# Patient Record
Sex: MALE | Race: ASIAN | NOT HISPANIC OR LATINO | Employment: FULL TIME | URBAN - METROPOLITAN AREA
[De-identification: names, ages, dates, MRNs, and addresses within clinical notes are randomized per-mention and may not be internally consistent; named-entity substitution may affect disease eponyms.]

---

## 2020-08-06 ENCOUNTER — RECORDS - HEALTHEAST (OUTPATIENT)
Dept: LAB | Facility: HOSPITAL | Age: 35
End: 2020-08-06

## 2020-08-06 LAB
BASOPHILS # BLD AUTO: 0 THOU/UL (ref 0–0.2)
BASOPHILS NFR BLD AUTO: 0 % (ref 0–2)
EOSINOPHIL # BLD AUTO: 0.3 THOU/UL (ref 0–0.4)
EOSINOPHIL NFR BLD AUTO: 5 % (ref 0–6)
ERYTHROCYTE [DISTWIDTH] IN BLOOD BY AUTOMATED COUNT: 12.6 % (ref 11–14.5)
HCT VFR BLD AUTO: 49.1 % (ref 40–54)
HGB BLD-MCNC: 16.2 G/DL (ref 14–18)
LYMPHOCYTES # BLD AUTO: 2.1 THOU/UL (ref 0.8–4.4)
LYMPHOCYTES NFR BLD AUTO: 30 % (ref 20–40)
MCH RBC QN AUTO: 28.7 PG (ref 27–34)
MCHC RBC AUTO-ENTMCNC: 33 G/DL (ref 32–36)
MCV RBC AUTO: 87 FL (ref 80–100)
MONOCYTES # BLD AUTO: 0.4 THOU/UL (ref 0–0.9)
MONOCYTES NFR BLD AUTO: 6 % (ref 2–10)
NEUTROPHILS # BLD AUTO: 4 THOU/UL (ref 2–7.7)
NEUTROPHILS NFR BLD AUTO: 58 % (ref 50–70)
PLATELET # BLD AUTO: 190 THOU/UL (ref 140–440)
PMV BLD AUTO: 10 FL (ref 8.5–12.5)
RBC # BLD AUTO: 5.65 MILL/UL (ref 4.4–6.2)
RHEUMATOID FACT SERPL-ACNC: <15 IU/ML (ref 0–30)
WBC: 6.9 THOU/UL (ref 4–11)

## 2020-08-07 LAB — ANA SER QL: 0.6 U

## 2020-08-08 LAB — ACE SERPL-CCNC: 28 U/L (ref 9–67)

## 2020-08-12 LAB
B LOCUS: NORMAL
B27TEST METHOD: NORMAL

## 2021-08-09 ENCOUNTER — HOSPITAL ENCOUNTER (EMERGENCY)
Facility: HOSPITAL | Age: 36
Discharge: HOME | End: 2021-08-09
Attending: FAMILY MEDICINE | Admitting: FAMILY MEDICINE
Payer: COMMERCIAL

## 2021-08-09 VITALS
OXYGEN SATURATION: 95 % | WEIGHT: 300 LBS | HEART RATE: 60 BPM | BODY MASS INDEX: 47.09 KG/M2 | RESPIRATION RATE: 20 BRPM | SYSTOLIC BLOOD PRESSURE: 165 MMHG | TEMPERATURE: 97.6 F | DIASTOLIC BLOOD PRESSURE: 100 MMHG | HEIGHT: 67 IN

## 2021-08-09 DIAGNOSIS — M51.16 HERNIATION OF LUMBAR INTERVERTEBRAL DISC WITH RADICULOPATHY: ICD-10-CM

## 2021-08-09 PROCEDURE — 99284 EMERGENCY DEPT VISIT MOD MDM: CPT

## 2021-08-09 PROCEDURE — 96372 THER/PROPH/DIAG INJ SC/IM: CPT | Performed by: FAMILY MEDICINE

## 2021-08-09 PROCEDURE — 250N000011 HC RX IP 250 OP 636: Performed by: FAMILY MEDICINE

## 2021-08-09 RX ORDER — GABAPENTIN 300 MG/1
300 CAPSULE ORAL 3 TIMES DAILY
Qty: 90 CAPSULE | Refills: 0 | Status: SHIPPED | OUTPATIENT
Start: 2021-08-09

## 2021-08-09 RX ORDER — KETOROLAC TROMETHAMINE 30 MG/ML
30 INJECTION, SOLUTION INTRAMUSCULAR; INTRAVENOUS ONCE
Status: COMPLETED | OUTPATIENT
Start: 2021-08-09 | End: 2021-08-09

## 2021-08-09 RX ORDER — PREDNISONE 10 MG/1
TABLET ORAL
Qty: 32 TABLET | Refills: 0 | Status: SHIPPED | OUTPATIENT
Start: 2021-08-09 | End: 2021-08-19

## 2021-08-09 RX ORDER — HYDROCODONE BITARTRATE AND ACETAMINOPHEN 5; 325 MG/1; MG/1
1 TABLET ORAL EVERY 6 HOURS PRN
Qty: 10 TABLET | Refills: 0 | Status: SHIPPED | OUTPATIENT
Start: 2021-08-09 | End: 2021-08-12

## 2021-08-09 RX ADMIN — KETOROLAC TROMETHAMINE 30 MG: 30 INJECTION, SOLUTION INTRAMUSCULAR; INTRAVENOUS at 12:13

## 2021-08-09 ASSESSMENT — ENCOUNTER SYMPTOMS
BACK PAIN: 1
NUMBNESS: 1

## 2021-08-09 ASSESSMENT — MIFFLIN-ST. JEOR: SCORE: 2244.42

## 2021-08-09 NOTE — Clinical Note
Marylou Simms was seen and treated in our emergency department on 8/9/2021.  He may return to work on 08/12/2021.       If you have any questions or concerns, please don't hesitate to call.      Ryne Tran MD

## 2021-08-09 NOTE — ED PROVIDER NOTES
EMERGENCY DEPARTMENT ENCOUNTER      NAME: Marylou Simms  AGE: 36 year old male  YOB: 1985  MRN: [id number]  EVALUATION DATE & TIME: 8/9/2021 11:54 AM    PCP: Freddy Vila    ED PROVIDER: Ryne Tran M.D.    Chief Complaint   Patient presents with     Leg Pain       FINAL IMPRESSION:  1. Herniation of lumbar intervertebral disc with radiculopathy        ED COURSE & MEDICAL DECISION MAKING:    Pertinent Labs & Imaging studies personally reviewed and interpreted by me. (See chart for details)    12:01 PM Patient seen and examined, prior records reviewed.  Differential diagnosis includes but not limited to lumbar strain, lumbar sprain, vertebral fracture, compression fracture, cauda equina syndrome, epidural abscess, spondylolisthesis, discitis, osteomyelitis, aortic dissection, aortic aneurysm.  Patient with right leg pain extending from buttock distally with some tingling, slightly decreased sensation of lower leg, upper leg intact, strength intact.  No bowel or bladder incontinence.  Symptoms are consistent with radiculopathy and likely herniated disc.  Patient will start on gabapentin, Norco, prednisone.  He is just finishing a Medrol Dosepak but will need extension is steroid to acute injury.  Follow-up with spine center.       At the conclusion of the encounter I discussed the results of all of the tests and the disposition. The questions were answered. The patient or family acknowledged understanding and was agreeable with the care plan.     PROCEDURES:   Procedures    MEDICATIONS GIVEN IN THE EMERGENCY:  Medications   ketorolac (TORADOL) injection 30 mg (30 mg Intramuscular Given 8/9/21 1213)       NEW PRESCRIPTIONS STARTED AT TODAY'S ER VISIT  Discharge Medication List as of 8/9/2021 12:37 PM      START taking these medications    Details   gabapentin (NEURONTIN) 300 MG capsule Take 1 capsule (300 mg) by mouth 3 times daily, Disp-90 capsule, R-0, Local Print       HYDROcodone-acetaminophen (NORCO) 5-325 MG tablet Take 1 tablet by mouth every 6 hours as needed for severe pain, Disp-10 tablet, R-0, Local Print      predniSONE (DELTASONE) 10 MG tablet Take 4 tablets daily for 5 days,  take 2 tablets daily for 3 days, take 1 tablet daily for 3 days, take half a tablet for 3 days., Disp-32 tablet, R-0, Local Print             =================================================================    HPI    Patient information was obtained from: Patient      Marylou Simms is a 36 year old male with a pertinent history of chronic right ankle pain who presents to this ED via walk in for evaluation of right back and leg pain.    Patient endorses lower right back pain that radiates down his right leg since yesterday (08/08). He also reports tingling/numbness in his toes. Patient states he took Tylenol yesterday for his symptoms. Of note, patient has been on medrol for gout the past few days. His last day on the medication will be tomorrow (08/10). Patient denies any other complaints at this time.    REVIEW OF SYSTEMS   Review of Systems   Musculoskeletal: Positive for back pain (Lower right).        Positive for right leg pain   Neurological: Positive for numbness (Toes).      All other systems reviewed and negative    PAST MEDICAL HISTORY:  No past medical history on file.    PAST SURGICAL HISTORY:  No past surgical history on file.    CURRENT MEDICATIONS:    No current facility-administered medications for this encounter.     Current Outpatient Medications   Medication     gabapentin (NEURONTIN) 300 MG capsule     HYDROcodone-acetaminophen (NORCO) 5-325 MG tablet     predniSONE (DELTASONE) 10 MG tablet     cetirizine (ZYRTEC) 10 MG tablet     oxyCODONE (ROXICODONE) 5 MG immediate release tablet       ALLERGIES:  No Known Allergies    FAMILY HISTORY:  No family history on file.    SOCIAL HISTORY:   Social History     Socioeconomic History     Marital status: Single     Spouse name: Not on  "file     Number of children: Not on file     Years of education: Not on file     Highest education level: Not on file   Occupational History     Not on file   Tobacco Use     Smoking status: Not on file   Substance and Sexual Activity     Alcohol use: Not on file     Drug use: Not on file     Sexual activity: Not on file   Other Topics Concern     Not on file   Social History Narrative     Not on file     Social Determinants of Health     Financial Resource Strain:      Difficulty of Paying Living Expenses:    Food Insecurity:      Worried About Running Out of Food in the Last Year:      Ran Out of Food in the Last Year:    Transportation Needs:      Lack of Transportation (Medical):      Lack of Transportation (Non-Medical):    Physical Activity:      Days of Exercise per Week:      Minutes of Exercise per Session:    Stress:      Feeling of Stress :    Social Connections:      Frequency of Communication with Friends and Family:      Frequency of Social Gatherings with Friends and Family:      Attends Congregation Services:      Active Member of Clubs or Organizations:      Attends Club or Organization Meetings:      Marital Status:    Intimate Partner Violence:      Fear of Current or Ex-Partner:      Emotionally Abused:      Physically Abused:      Sexually Abused:        VITALS:  BP (!) 165/100   Pulse 60   Temp 97.6  F (36.4  C) (Oral)   Resp 20   Ht 1.702 m (5' 7\")   Wt 136.1 kg (300 lb)   SpO2 95%   BMI 46.99 kg/m      PHYSICAL EXAM:  Physical Exam  Vitals and nursing note reviewed.   Constitutional:       Appearance: Normal appearance.   HENT:      Head: Normocephalic and atraumatic.      Right Ear: External ear normal.      Left Ear: External ear normal.      Nose: Nose normal.      Mouth/Throat:      Mouth: Mucous membranes are moist.   Eyes:      Extraocular Movements: Extraocular movements intact.      Conjunctiva/sclera: Conjunctivae normal.      Pupils: Pupils are equal, round, and reactive to " light.   Cardiovascular:      Rate and Rhythm: Normal rate and regular rhythm.   Pulmonary:      Effort: Pulmonary effort is normal.      Breath sounds: Normal breath sounds. No wheezing or rales.   Abdominal:      General: Abdomen is flat. There is no distension.      Palpations: Abdomen is soft.      Tenderness: There is no abdominal tenderness. There is no guarding.   Musculoskeletal:         General: Normal range of motion.      Cervical back: Normal range of motion and neck supple.      Right lower leg: No edema.      Left lower leg: No edema.   Lymphadenopathy:      Cervical: No cervical adenopathy.   Skin:     General: Skin is warm and dry.   Neurological:      Mental Status: He is alert and oriented to person, place, and time. Mental status is at baseline.      Comments: No gross focal neurologic deficits. Straight leg raise positive. Decreased sensation right lateral lower leg.   Psychiatric:         Mood and Affect: Mood normal.         Behavior: Behavior normal.         Thought Content: Thought content normal.          LAB:  All pertinent labs reviewed and interpreted.       RADIOLOGY:  Reviewed all pertinent imaging. Please see official radiology report.  No orders to display       EKG:    None    I, Keesha Andrews, am serving as a scribe to document services personally performed by Dr. Tran based on my observation and the provider's statements to me. I, Ryne Tran MD attest that Keesha Andrews is acting in a scribe capacity, has observed my performance of the services and has documented them in accordance with my direction.    Ryne Tran M.D.  Emergency Medicine  [facility] EMERGENCY DEPARTMENT  [facility] [facility] [id number] [phone]  Dept: [phone]     Ryne Tran MD  08/09/21 0148

## 2021-08-09 NOTE — ED TRIAGE NOTES
Patient is having a sharp pain from his right hip going down his leg. Has been since yesterday. Has had a similar pain on the left about 6 months ago, was not as severe.

## 2021-08-19 ENCOUNTER — OFFICE VISIT (OUTPATIENT)
Dept: PHYSICAL MEDICINE AND REHAB | Facility: CLINIC | Age: 36
End: 2021-08-19
Payer: COMMERCIAL

## 2021-08-19 ENCOUNTER — ANCILLARY PROCEDURE (OUTPATIENT)
Dept: RADIOLOGY | Facility: CLINIC | Age: 36
End: 2021-08-19
Payer: COMMERCIAL

## 2021-08-19 VITALS
HEIGHT: 67 IN | BODY MASS INDEX: 47.09 KG/M2 | DIASTOLIC BLOOD PRESSURE: 86 MMHG | SYSTOLIC BLOOD PRESSURE: 153 MMHG | WEIGHT: 300 LBS | HEART RATE: 94 BPM

## 2021-08-19 DIAGNOSIS — M51.16 HERNIATION OF LUMBAR INTERVERTEBRAL DISC WITH RADICULOPATHY: ICD-10-CM

## 2021-08-19 PROCEDURE — 99204 OFFICE O/P NEW MOD 45 MIN: CPT | Performed by: PHYSICIAN ASSISTANT

## 2021-08-19 RX ORDER — CYCLOBENZAPRINE HCL 10 MG
TABLET ORAL
COMMUNITY
Start: 2021-08-11

## 2021-08-19 RX ORDER — ATROPINE SULFATE 10 MG/ML
SOLUTION/ DROPS OPHTHALMIC
COMMUNITY
Start: 2021-06-28

## 2021-08-19 RX ORDER — ALLOPURINOL 300 MG/1
300 TABLET ORAL
COMMUNITY
Start: 2021-08-04

## 2021-08-19 RX ORDER — COLCHICINE 0.6 MG/1
TABLET ORAL
COMMUNITY
Start: 2021-08-04

## 2021-08-19 ASSESSMENT — PAIN SCALES - GENERAL: PAINLEVEL: SEVERE PAIN (6)

## 2021-08-19 ASSESSMENT — MIFFLIN-ST. JEOR: SCORE: 2244.42

## 2021-08-19 NOTE — PATIENT INSTRUCTIONS
Please schedule this injection at least 1 week  from now to allow time for insurance prior authorization.  On the day of your injection, you cannot be sick or taking antibiotics.  If you become sick and are prescribed, please call the clinic so your injection can be rescheduled for once you have completed your antibiotics.  You will need to bring a  with you for your injection.   If you have any questions or concerns prior to your injection, please do not hesitate to call the nurse navigation line at [phone] or contact Katya Chao through [facility].

## 2021-08-19 NOTE — PROGRESS NOTES
ASSESSMENT: Marylou Simms is a 36 year old male with past medical history significant for gout obesity who presents today for new patient evaluation of acute right greater than left low back pain with radiation into the right greater than left lower extremity with associated numbness, tingling, weakness.  An MRI lumbar spine shows a right L4-5 disc extrusion contributing to severe spinal stenosis with compression of the right L5 nerve root in the caudal portion of the extruded disc causes moderate to severe thecal sac stenosis at L5-S1.  On exam, patient demonstrated weakness in the right EHL and right ankle dorsiflexors.  He had a sensory deficit right L5 and S1 dermatomes.  He had an absent right Achilles reflex.    PLAN:  A shared decision making model was used.  The patient's values and choices were respected.  The following represents what was discussed and decided upon by the physician assistant and the patient.      1.  DIAGNOSTIC TESTS: I reviewed the report of the MRI lumbar spine from [facility] done earlier this morning.  We requested that the images get pushed over to our system.    2.  PHYSICAL THERAPY:  Discussed the importance of core strengthening, ROM, stretching exercises with the patient and how each of these entities is important in decreasing pain.  Explained to the patient that the purpose of physical therapy is to teach the patient a home exercise program.  These exercises need to be performed every day in order to decrease pain and prevent future occurrences of pain.  I entered a referral to physical therapy.    3.  MEDICATIONS: No changes are made to the patient's medications.  -Patient has been taking gabapentin 300 mg 3 times daily and his regular doctor yesterday increase his dose to 600 mg 3 times daily.  This could potentially be titrated up to 900 mg 3 times daily if needed.  -Patient can continue take ibuprofen as needed.  -Patient completed a course of prednisone.  -Patient stopped using  "oxycodone/hydrocodone because he wants to avoid opiates.    4.  INTERVENTIONS: Due to the patient's significant weakness I recommended a right L4-5, L5-S1 transforaminal epidural steroid injection.  Patient indicated he would like to proceed and an order was placed.  Second Covid vaccine was in January 2021.    5.  PATIENT EDUCATION: Patient is in agreement the above plan.  All questions were answered.  -I told the patient that if symptoms fail to improve, would recommend a referral to neurosurgery.  We will need to closely monitor his strength.    6.  WORK: Patient works in the  for [email or web address].  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  He has been off of work since August 5, initially for a flare of gout, and now for his back injury.  He would like to try to return to light duty work.  His work note expires August 22.  I provided a work note indicating that the patient may return to work on August 23 with light duty work restrictions including no lifting more than 10 pounds, no bending and twisting at the waist, must able alternate sit to stand every 15 minutes.  Work restrictions will remain in place until follow-up visit in approximately 2 weeks.    7.  FOLLOW-UP:   I will see the patient back in the clinic 1 week after his right L4-5, L5-S1 transforaminal epidural steroid injection to recheck his neurologic exam.  If he has questions or concerns in the meantime, he should not hesitate to call.      SUBJECTIVE:  Marylou Simms  Is a 36 year old male who presents today as a referral from the emergency department (August 9, 2021) for new patient evaluation of acute right greater than left low back pain with radiation into the right greater than left lower extremity with associated numbness, tingling, and weakness.  Patient reports that pain began on August 8, 2021.  He was sitting on a stool getting ready to go fishing.  Upon standing he felt like he \"tweaked\" his back.  He went fishing with pain.  When " he got home he could not stand up straight.  The next morning he could barely move because of the pain so he went to the emergency department.  He was discharged home with pain relief medications.  He followed up with his primary care provider and had an MRI lumbar spine earlier this morning which will be described below.  He was referred to our clinic for further evaluation and treatment.    Patient complains of right greater than left low back pain.  Pain radiates into the right greater than left buttock.  On the right the pain then radiates down the lateral thigh into the lateral calf.  He has numbness and tingling in the anterior lateral distal lower leg which extends into the foot.  Patient feels that the whole foot is numb and tingly.  He has a swollen sensation in the right great toe but it is not swollen.  He denies weakness.  Pain is aggravated with sitting more than 10 to 15 minutes.  Pain is alleviated with constant movement.  He is most comfortable lying on his left side or stomach.  He denies any loss of bowel or bladder control.  Denies any recent fevers, chills, sweats.    Patient has not had any treatments for back pain.  He has not had physical therapy.  He does not have a chiropractor.  He has never had any spine surgeries or spine injections.  Patient is using gabapentin 300 mg 3 times daily but yesterday his primary care provider increase his dose to 600 mg 3 times daily.  He also uses ibuprofen as needed.  He did not feel like his pain is under adequate control.  He completed a course of prednisone but continued to have pain.  He stopped using oxycodone and hydrocodone because he wants to avoid opiates.    Patient works in the  for [employer].  He lifts PPE boxes.  He has been off of work since August 5.  He was initially off of work for a flare of gout in his left toe but has remained off of work because of his back injury.  He is scheduled to return to work August  23.    Current Outpatient Medications   Medication     cetirizine (ZYRTEC) 10 MG tablet     gabapentin (NEURONTIN) 300 MG capsule     oxyCODONE (ROXICODONE) 5 MG immediate release tablet     predniSONE (DELTASONE) 10 MG tablet         No Known Allergies    Past medical history: Obesity, gout    Surgical history: None    Family history: Mother had hypertension    Social history: Patient is single.  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  He drinks alcohol on weekends.  He denies tobacco use.  He denies illicit drug use.      ROS: Positive for weight gain, hoarseness, changes in vision, eye pain, sciatica, joint pain, muscle pain, itching.  Specifically negative for bowel/bladder dysfunction, fevers,chills, appetite changes, unexplained weight loss.   Otherwise 13 systems reviewed are negative.  Please see the patient's intake questionnaire from today for details.      OBJECTIVE:  PHYSICAL EXAMINATION:    CONSTITUTIONAL:  Vital signs as above.  Patient appears to be in pain.  The patient is well nourished and well groomed.  PSYCHIATRIC:  The patient is awake, alert, oriented to person, place, time and answering questions appropriately with clear speech.    HEENT: Normocephalic, atraumatic.  Sclera clear.  Neck is supple.  SKIN:  Skin over the face, bilateral lower extremities, and posterior torso is clean, dry, intact without rashes.    GAIT:  Gait is antalgic, favoring the right.  The patient is able to heel and toe walk without significant difficulty.    STANDING EXAMINATION: Tender to palpation right lower lumbar paraspinous muscles.  Lumbar flexion moderately restricted with increased pain.  Lumbar extension mildly restricted with increased pain.  MUSCLE STRENGTH:  The patient has 4/5 strength right ankle dorsiflexors and right EHL.  5/5 strength for the bilateral hip flexors, knee flexors/extensors, left ankle dorsiflexors, right ankle plantar flexors, left great toe  extensors.  NEUROLOGICAL: Reflexes are 2+ bilateral patellar, 2+ left and absent right Achilles.  Negative Babinski's bilaterally.  No ankle clonus bilaterally.  Diminished/altered sensation right L5 and S1 dermatomes.  VASCULAR:  2/4 dorsalis pedis pulses bilaterally.  Bilateral lower extremities are warm.  There is no pitting edema of the bilateral lower extremities.  ABDOMINAL:  Soft, non-distended, non-tender throughout all quadrants.  No pulsatile mass palpated in the left lower quadrant.  LYMPH NODES:  No palpable or tender inguinal lymph nodes.  MUSCULOSKELETAL: Straight leg raise on the left reproduces back pain, but not leg pain.  Straight leg raise on the right reproduces pain radiating down right leg.    RESULTS: I reviewed the report of an MRI lumbar spine from [facility] from August 19, 2021.  I do not have the actual images to review today.  At L4-5 there is a inferiorly directed right sided disc extrusion extending to L5-S1 which compresses the right L5 nerve root in the lateral recess and causes severe spinal stenosis.  At L5-S1 the caudal margin of the L4-5 disc extrusion contributes to moderate to severe thecal sac stenosis.  Please see report for further details.

## 2021-08-19 NOTE — LETTER
8/19/2021         RE: Marylou Simms  [address]  [address]  [place] [address]        Dear Colleague,    Thank you for referring your patient, Marylou Simms, to the [facility] [place]. Please see a copy of my visit note below.    ASSESSMENT: Marylou Simms is a 36 year old male with past medical history significant for gout obesity who presents today for new patient evaluation of acute right greater than left low back pain with radiation into the right greater than left lower extremity with associated numbness, tingling, weakness.  An MRI lumbar spine shows a right L4-5 disc extrusion contributing to severe spinal stenosis with compression of the right L5 nerve root in the caudal portion of the extruded disc causes moderate to severe thecal sac stenosis at L5-S1.  On exam, patient demonstrated weakness in the right EHL and right ankle dorsiflexors.  He had a sensory deficit right L5 and S1 dermatomes.  He had an absent right Achilles reflex.    PLAN:  A shared decision making model was used.  The patient's values and choices were respected.  The following represents what was discussed and decided upon by the physician assistant and the patient.      1.  DIAGNOSTIC TESTS: I reviewed the report of the MRI lumbar spine from [facility] done earlier this morning.  We requested that the images get pushed over to our system.    2.  PHYSICAL THERAPY:  Discussed the importance of core strengthening, ROM, stretching exercises with the patient and how each of these entities is important in decreasing pain.  Explained to the patient that the purpose of physical therapy is to teach the patient a home exercise program.  These exercises need to be performed every day in order to decrease pain and prevent future occurrences of pain.  I entered a referral to physical therapy.    3.  MEDICATIONS: No changes are made to the patient's medications.  -Patient has been taking gabapentin 300 mg 3 times daily and his regular  doctor yesterday increase his dose to 600 mg 3 times daily.  This could potentially be titrated up to 900 mg 3 times daily if needed.  -Patient can continue take ibuprofen as needed.  -Patient completed a course of prednisone.  -Patient stopped using oxycodone/hydrocodone because he wants to avoid opiates.    4.  INTERVENTIONS: Due to the patient's significant weakness I recommended a right L4-5, L5-S1 transforaminal epidural steroid injection.  Patient indicated he would like to proceed and an order was placed.  Second Covid vaccine was in January 2021.    5.  PATIENT EDUCATION: Patient is in agreement the above plan.  All questions were answered.  -I told the patient that if symptoms fail to improve, would recommend a referral to neurosurgery.  We will need to closely monitor his strength.    6.  WORK: Patient works in the  for [employer].  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  He has been off of work since August 5, initially for a flare of gout, and now for his back injury.  He would like to try to return to light duty work.  His work note expires August 22.  I provided a work note indicating that the patient may return to work on August 23 with light duty work restrictions including no lifting more than 10 pounds, no bending and twisting at the waist, must able alternate sit to stand every 15 minutes.  Work restrictions will remain in place until follow-up visit in approximately 2 weeks.    7.  FOLLOW-UP:   I will see the patient back in the clinic 1 week after his right L4-5, L5-S1 transforaminal epidural steroid injection to recheck his neurologic exam.  If he has questions or concerns in the meantime, he should not hesitate to call.      SUBJECTIVE:  Marylou Simms  Is a 36 year old male who presents today as a referral from the emergency department (August 9, 2021) for new patient evaluation of acute right greater than left low back pain with radiation into the right greater than left lower  "extremity with associated numbness, tingling, and weakness.  Patient reports that pain began on August 8, 2021.  He was sitting on a stool getting ready to go fishing.  Upon standing he felt like he \"tweaked\" his back.  He went fishing with pain.  When he got home he could not stand up straight.  The next morning he could barely move because of the pain so he went to the emergency department.  He was discharged home with pain relief medications.  He followed up with his primary care provider and had an MRI lumbar spine earlier this morning which will be described below.  He was referred to our clinic for further evaluation and treatment.    Patient complains of right greater than left low back pain.  Pain radiates into the right greater than left buttock.  On the right the pain then radiates down the lateral thigh into the lateral calf.  He has numbness and tingling in the anterior lateral distal lower leg which extends into the foot.  Patient feels that the whole foot is numb and tingly.  He has a swollen sensation in the right great toe but it is not swollen.  He denies weakness.  Pain is aggravated with sitting more than 10 to 15 minutes.  Pain is alleviated with constant movement.  He is most comfortable lying on his left side or stomach.  He denies any loss of bowel or bladder control.  Denies any recent fevers, chills, sweats.    Patient has not had any treatments for back pain.  He has not had physical therapy.  He does not have a chiropractor.  He has never had any spine surgeries or spine injections.  Patient is using gabapentin 300 mg 3 times daily but yesterday his primary care provider increase his dose to 600 mg 3 times daily.  He also uses ibuprofen as needed.  He did not feel like his pain is under adequate control.  He completed a course of prednisone but continued to have pain.  He stopped using oxycodone and hydrocodone because he wants to avoid opiates.    Patient works in the  for [employer].  He lifts PPE boxes.  He has been off of work since August 5.  He was initially off of work for a flare of gout in his left toe but has remained off of work because of his back injury.  He is scheduled to return to work August 23.    Current Outpatient Medications   Medication     cetirizine (ZYRTEC) 10 MG tablet     gabapentin (NEURONTIN) 300 MG capsule     oxyCODONE (ROXICODONE) 5 MG immediate release tablet     predniSONE (DELTASONE) 10 MG tablet         No Known Allergies    Past medical history: Obesity, gout    Surgical history: None    Family history: Mother had hypertension    Social history: Patient is single.  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  He drinks alcohol on weekends.  He denies tobacco use.  He denies illicit drug use.      ROS: Positive for weight gain, hoarseness, changes in vision, eye pain, sciatica, joint pain, muscle pain, itching.  Specifically negative for bowel/bladder dysfunction, fevers,chills, appetite changes, unexplained weight loss.   Otherwise 13 systems reviewed are negative.  Please see the patient's intake questionnaire from today for details.      OBJECTIVE:  PHYSICAL EXAMINATION:    CONSTITUTIONAL:  Vital signs as above.  Patient appears to be in pain.  The patient is well nourished and well groomed.  PSYCHIATRIC:  The patient is awake, alert, oriented to person, place, time and answering questions appropriately with clear speech.    HEENT: Normocephalic, atraumatic.  Sclera clear.  Neck is supple.  SKIN:  Skin over the face, bilateral lower extremities, and posterior torso is clean, dry, intact without rashes.    GAIT:  Gait is antalgic, favoring the right.  The patient is able to heel and toe walk without significant difficulty.    STANDING EXAMINATION: Tender to palpation right lower lumbar paraspinous muscles.  Lumbar flexion moderately restricted with increased pain.  Lumbar extension mildly restricted with increased  pain.  MUSCLE STRENGTH:  The patient has 4/5 strength right ankle dorsiflexors and right EHL.  5/5 strength for the bilateral hip flexors, knee flexors/extensors, left ankle dorsiflexors, right ankle plantar flexors, left great toe extensors.  NEUROLOGICAL: Reflexes are 2+ bilateral patellar, 2+ left and absent right Achilles.  Negative Babinski's bilaterally.  No ankle clonus bilaterally.  Diminished/altered sensation right L5 and S1 dermatomes.  VASCULAR:  2/4 dorsalis pedis pulses bilaterally.  Bilateral lower extremities are warm.  There is no pitting edema of the bilateral lower extremities.  ABDOMINAL:  Soft, non-distended, non-tender throughout all quadrants.  No pulsatile mass palpated in the left lower quadrant.  LYMPH NODES:  No palpable or tender inguinal lymph nodes.  MUSCULOSKELETAL: Straight leg raise on the left reproduces back pain, but not leg pain.  Straight leg raise on the right reproduces pain radiating down right leg.    RESULTS: I reviewed the report of an MRI lumbar spine from [facility] from August 19, 2021.  I do not have the actual images to review today.  At L4-5 there is a inferiorly directed right sided disc extrusion extending to L5-S1 which compresses the right L5 nerve root in the lateral recess and causes severe spinal stenosis.  At L5-S1 the caudal margin of the L4-5 disc extrusion contributes to moderate to severe thecal sac stenosis.  Please see report for further details.        Again, thank you for allowing me to participate in the care of your patient.        Sincerely,        Katya Chao PA-C

## 2021-08-19 NOTE — LETTER
August 19, 2021      Marylou Simms  [address]  [address]  [place] [address]        To Whom It May Concern:    Marylou Simms was seen in our clinic. He may return to work 8/23/21 with the following: limited to light duty - lifting no greater than 10 pounds, no repetitive bending/twisting, must be able to alternate sit to stand every 15 minutes.  Restrictions will remain in place for approximately 2 weeks, until next follow up visit.      Sincerely,        Katya Chao PA-C

## 2021-08-22 ENCOUNTER — HEALTH MAINTENANCE LETTER (OUTPATIENT)
Age: 36
End: 2021-08-22

## 2021-08-24 ENCOUNTER — ANCILLARY PROCEDURE (OUTPATIENT)
Dept: PHYSICAL MEDICINE AND REHAB | Facility: CLINIC | Age: 36
End: 2021-08-24
Payer: COMMERCIAL

## 2021-08-24 VITALS
SYSTOLIC BLOOD PRESSURE: 136 MMHG | TEMPERATURE: 97.9 F | DIASTOLIC BLOOD PRESSURE: 72 MMHG | HEART RATE: 79 BPM | RESPIRATION RATE: 16 BRPM | OXYGEN SATURATION: 96 %

## 2021-08-24 DIAGNOSIS — M51.16 HERNIATION OF LUMBAR INTERVERTEBRAL DISC WITH RADICULOPATHY: ICD-10-CM

## 2021-08-24 PROCEDURE — 64484 NJX AA&/STRD TFRM EPI L/S EA: CPT | Mod: RT | Performed by: PHYSICAL MEDICINE & REHABILITATION

## 2021-08-24 PROCEDURE — 64483 NJX AA&/STRD TFRM EPI L/S 1: CPT | Mod: RT | Performed by: PHYSICAL MEDICINE & REHABILITATION

## 2021-08-24 RX ORDER — LIDOCAINE HYDROCHLORIDE 10 MG/ML
INJECTION, SOLUTION EPIDURAL; INFILTRATION; INTRACAUDAL; PERINEURAL
Status: COMPLETED | OUTPATIENT
Start: 2021-08-24 | End: 2021-08-24

## 2021-08-24 RX ORDER — METHYLPREDNISOLONE ACETATE 80 MG/ML
INJECTION, SUSPENSION INTRA-ARTICULAR; INTRALESIONAL; INTRAMUSCULAR; SOFT TISSUE
Status: COMPLETED | OUTPATIENT
Start: 2021-08-24 | End: 2021-08-24

## 2021-08-24 RX ADMIN — LIDOCAINE HYDROCHLORIDE 5 ML: 10 INJECTION, SOLUTION EPIDURAL; INFILTRATION; INTRACAUDAL; PERINEURAL at 11:37

## 2021-08-24 RX ADMIN — METHYLPREDNISOLONE ACETATE 80 MG: 80 INJECTION, SUSPENSION INTRA-ARTICULAR; INTRALESIONAL; INTRAMUSCULAR; SOFT TISSUE at 11:37

## 2021-08-24 ASSESSMENT — PAIN SCALES - GENERAL
PAINLEVEL: SEVERE PAIN (6)
PAINLEVEL: SEVERE PAIN (6)

## 2021-08-24 NOTE — PATIENT INSTRUCTIONS
Follow-up visit with VEDA Alvarado in 2 weeks to discuss injection outcome and determine care plan going forward.       DISCHARGE INSTRUCTIONS    During office hours (8:00 a.m.- 4:00 p.m.) questions or concerns may be answered  by calling Spine Center Navigation Nurses at  [phone].  Messages received after hours will be returned the following business day.      In the case of an emergency, please dial 911 or seek assistance at the nearest Emergency Room/Urgent Care facility.     All Patients:    ? You may experience an increase in your symptoms for the first 2 days (It may take anywhere between 2 days- 2 weeks for the steroid to have maximum effect).    ? You may use ice on the injection site, as frequently as 20 minutes each hour if needed.    ? You may take your pain medicine.    ? You may continue taking your regular medication after your injection. If you have had a Medial Branch Block you may resume pain medication once your pain diary is completed.    ? You may shower. No swimming, tub bath or hot tub for 48 hours.  You may remove your bandaid/bandage as soon as you are home.    ? You may resume light activities, as tolerated.    ? Resume your usual diet as tolerated.    ? It is strongly advised that you do not drive for 1-3 hours post injection.    ? If you have had oral sedation:  Do not drive for 8 hours post injection.      ? If you have had IV sedation:  Do not drive for 24 hours post injection.  Do not operate hazardous machinery or make important personal/business decisions for 24 hours.      POSSIBLE STEROID SIDE EFFECTS (If steroid/cortisone was used for your procedure)    -If you experience these symptoms, it should only last for a short period      Swelling of the legs                Skin redness (flushing)       Mouth (oral) irritation     Blood sugar (glucose) levels              Sweats                      Mood changes    Headache    Sleeplessness    Weakened immune system for up to 14  days, which could increase the risk of jessica the COVID-19 virus and/or experiencing more severe symptoms of the disease, if exposed.    Decreased effectiveness of the flu vaccine if given within 2 weeks of the steroid.         POSSIBLE PROCEDURE SIDE EFFECTS  -Call the Spine Center if you are concerned    Increased Pain             Increased numbness/tingling        Nausea/Vomiting            Bruising/bleeding at site        Redness or swelling                                                Difficulty walking        Weakness             Fever greater than 100.5    *In the event of a severe headache after an epidural steroid injection that is relieved by lying down, please call the [facility] to speak with a clinical staff member*

## 2021-09-09 ENCOUNTER — OFFICE VISIT (OUTPATIENT)
Dept: PHYSICAL MEDICINE AND REHAB | Facility: CLINIC | Age: 36
End: 2021-09-09
Payer: COMMERCIAL

## 2021-09-09 VITALS
BODY MASS INDEX: 47.09 KG/M2 | WEIGHT: 300 LBS | HEART RATE: 79 BPM | SYSTOLIC BLOOD PRESSURE: 143 MMHG | DIASTOLIC BLOOD PRESSURE: 86 MMHG | HEIGHT: 67 IN

## 2021-09-09 DIAGNOSIS — M51.16 HERNIATION OF LUMBAR INTERVERTEBRAL DISC WITH RADICULOPATHY: Primary | ICD-10-CM

## 2021-09-09 PROCEDURE — 99214 OFFICE O/P EST MOD 30 MIN: CPT | Performed by: PHYSICIAN ASSISTANT

## 2021-09-09 ASSESSMENT — MIFFLIN-ST. JEOR: SCORE: 2244.42

## 2021-09-09 ASSESSMENT — PAIN SCALES - GENERAL: PAINLEVEL: MODERATE PAIN (5)

## 2021-09-09 NOTE — LETTER
9/9/2021         RE: Marylou Simms  [address]  [address]  [place] [address]        Dear Colleague,    Thank you for referring your patient, Marylou Simms, to the [facility] [place]. Please see a copy of my visit note below.    Assessment:   Marylou Simms is a 36 year old y.o. male with past medical history significant for gout, obesity who presents today for follow-up regarding acute right greater than left low back pain with radiation into the right greater than left lower extremity with associated numbness, tingling, weakness.  My review of An MRI lumbar spine shows a right L4-5 disc extrusion contributing to severe spinal stenosis with compression of the right L5 nerve root in the caudal portion of the extruded disc causes moderate to severe thecal sac stenosis at L5-S1. Patient status post right L4-5, L5-S1 transforaminal epidural steroid injections August 24, 2021 which provided 90% relief of the pain for 1 week, but after that pain has been returning.  He estimates his pain relief now at 50%.  Left leg pain has actually increased since the injection.  On exam he demonstrates weakness right greater than left EHL.  He had a sensory deficit right L5 and S1 dermatomes.  He reports of the past 2 weeks 4-5 episodes of urinary incontinence in which he could not hold his urine when waiting to use the bathroom.  Denies loss of bowel control.       Plan:     A shared decision making plan was used.  The patient's values and choices were respected.  The following represents what was discussed and decided upon by the physician assistant and the patient.      1.  DIAGNOSTIC TESTS: I reviewed the MRI lumbar spine. No further diagnostic tests were ordered.    2.  PHYSICAL THERAPY: Patient scheduled to begin physical therapy September 20, 2021.    3.  MEDICATIONS:  No changes are made to the patient's medications.  -He takes gabapentin 600 mg 3 times daily.  -Patient can continue take ibuprofen as  needed.  -Patient can continue Tylenol as needed.  -Patient can continue cyclobenzaprine as needed.      4.  INTERVENTIONS: No additional interventions were ordered.  At this point I think it is most appropriate that the patient see neurosurgery.  He is having some bladder dysfunction.  I do not think I have a better injection for the right lower extremity symptoms.    5.  REFERRALS: I entered an urgent referral for the patient to see neurosurgery.  He is having urinary incontinence.  He continues to have weakness and numbness after his epidural steroid injection.    6.  WORK: We will keep the patient on same light duty work restrictions including no lifting more than 10 pounds, no repetitive bending and twisting, must be able to alternate sit to stand every 15 minutes.  Patient had a workability form for me to fill out which I did for him today.    7.  FOLLOW-UP: Patient will follow up with me as needed.  We will await recommendations from neurosurgery.  If he has questions or concerns, he should not hesitate to call.    Subjective:     Marylou Simms is a 36 year old male who presents today for follow-up regarding acute right greater than left low back pain with radiation into the right greater than left lower extremity with associated numbness, tingling, and weakness. Patient is status post a right L4-5, L5-S1 transforaminal epidural steroid injection on August 24, 2021. Patient reports the injection provided 90% relief of his pain for the first week, but after that pain has been returning.  He now estimates his pain at 50% improvement.  Unfortunate, he is having increased left leg pain since the injection.  He continues to have numbness and weakness in the right leg.  He also has new urinary incontinence.  This is happened 4-5 times since the injection.  He states that he has to wait to use the bathroom because someone else is using it he cannot hold his urine and he starts to leak.  Denies loss of bowel  control.    Patient complains of right greater than left low back pain.  Pain radiates into the right groin left buttock, posterior thigh, posterior calf.  Pain is 80% right-sided and 20% left-sided.  He has numbness and tingling in the right posterior lateral calf and lateral and dorsal foot.  He states this has improved.  He continues to feel weak in the right leg, but it is better.  He states he is able to manage the weakness better now than he could before the injection.  He rates his pain today as a 5-10.  Pain is aggravated with stretching, sitting, standing.  Pain is alleviated with sitting in different positions.    Patient is scheduled to begin physical therapy September 20.  He is taking gabapentin 6 and a milligrams 3 times daily.  He takes Tylenol and ibuprofen as needed.  He also uses cyclobenzaprine as needed.  Medications are somewhat helpful for pain.    Review of Systems:  Positive for numbness/tingling, weakness, wetting pants, headache, pain much worse at night.  Negative for loss of bowel/bladder control, inability to urinate, trip/stumble/falls medical to swallowing, difficulty with hand skills, fevers, unintentional weight loss.     Objective:   CONSTITUTIONAL:  Vital signs as above.  No acute distress.  The patient is well nourished and well groomed.  Patient is obese.  PSYCHIATRIC:  The patient is awake, alert, oriented to person, place and time.  The patient is answering questions appropriately with clear speech.  Normal affect.  HEENT: Normocephalic, atraumatic.  Sclera clear.    SKIN:  Skin over the face, posterior torso, bilateral upper and lower extremities is clean, dry, intact without rashes.  VASCULAR: No significant lower extremity edema.  MUSCULOSKELETAL:  Gait is non-antalgic.  Patient demonstrates slight weakness heel walking on the right.  Able to toe walk bilaterally with no difficulty.  Mild tenderness over the right greater than left lumbar paraspinal muscles.      The patient  has 4/5 right and 4+/5 left EHL, otherwise 5/5 strength for the bilateral hip flexors, knee flexors/extensors, ankle dorsiflexors/plantar flexors.  NEUROLOGICAL: 1+ patellar, achilles reflexes which are symmetric bilaterally.  No ankle clonus bilaterally.  Diminished sensation right L5 and S1 dermatomes.     RESULTS:  I reviewed the report of an MRI lumbar spine from [facility] from August 19, 2021.  I do not have the actual images to review today.  At L4-5 there is a inferiorly directed right sided disc extrusion extending to L5-S1 which compresses the right L5 nerve root in the lateral recess and causes severe spinal stenosis.  At L5-S1 the caudal margin of the L4-5 disc extrusion contributes to moderate to severe thecal sac stenosis.  Please see report for further details.             Again, thank you for allowing me to participate in the care of your patient.        Sincerely,        Katya Chao PA-C

## 2021-09-09 NOTE — PROGRESS NOTES
Assessment:   Marylou Simms is a 36 year old y.o. male with past medical history significant for gout, obesity who presents today for follow-up regarding acute right greater than left low back pain with radiation into the right greater than left lower extremity with associated numbness, tingling, weakness.  My review of An MRI lumbar spine shows a right L4-5 disc extrusion contributing to severe spinal stenosis with compression of the right L5 nerve root in the caudal portion of the extruded disc causes moderate to severe thecal sac stenosis at L5-S1. Patient status post right L4-5, L5-S1 transforaminal epidural steroid injections August 24, 2021 which provided 90% relief of the pain for 1 week, but after that pain has been returning.  He estimates his pain relief now at 50%.  Left leg pain has actually increased since the injection.  On exam he demonstrates weakness right greater than left EHL.  He had a sensory deficit right L5 and S1 dermatomes.  He reports of the past 2 weeks 4-5 episodes of urinary incontinence in which he could not hold his urine when waiting to use the bathroom.  Denies loss of bowel control.       Plan:     A shared decision making plan was used.  The patient's values and choices were respected.  The following represents what was discussed and decided upon by the physician assistant and the patient.      1.  DIAGNOSTIC TESTS: I reviewed the MRI lumbar spine. No further diagnostic tests were ordered.    2.  PHYSICAL THERAPY: Patient scheduled to begin physical therapy September 20, 2021.    3.  MEDICATIONS:  No changes are made to the patient's medications.  -He takes gabapentin 600 mg 3 times daily.  -Patient can continue take ibuprofen as needed.  -Patient can continue Tylenol as needed.  -Patient can continue cyclobenzaprine as needed.      4.  INTERVENTIONS: No additional interventions were ordered.  At this point I think it is most appropriate that the patient see neurosurgery.  He is having  some bladder dysfunction.  I do not think I have a better injection for the right lower extremity symptoms.    5.  REFERRALS: I entered an urgent referral for the patient to see neurosurgery.  He is having urinary incontinence.  He continues to have weakness and numbness after his epidural steroid injection.    6.  WORK: We will keep the patient on same light duty work restrictions including no lifting more than 10 pounds, no repetitive bending and twisting, must be able to alternate sit to stand every 15 minutes.  Patient had a workability form for me to fill out which I did for him today.    7.  FOLLOW-UP: Patient will follow up with me as needed.  We will await recommendations from neurosurgery.  If he has questions or concerns, he should not hesitate to call.    Subjective:     Marylou Simms is a 36 year old male who presents today for follow-up regarding acute right greater than left low back pain with radiation into the right greater than left lower extremity with associated numbness, tingling, and weakness. Patient is status post a right L4-5, L5-S1 transforaminal epidural steroid injection on August 24, 2021. Patient reports the injection provided 90% relief of his pain for the first week, but after that pain has been returning.  He now estimates his pain at 50% improvement.  Unfortunate, he is having increased left leg pain since the injection.  He continues to have numbness and weakness in the right leg.  He also has new urinary incontinence.  This is happened 4-5 times since the injection.  He states that he has to wait to use the bathroom because someone else is using it he cannot hold his urine and he starts to leak.  Denies loss of bowel control.    Patient complains of right greater than left low back pain.  Pain radiates into the right groin left buttock, posterior thigh, posterior calf.  Pain is 80% right-sided and 20% left-sided.  He has numbness and tingling in the right posterior lateral calf and  lateral and dorsal foot.  He states this has improved.  He continues to feel weak in the right leg, but it is better.  He states he is able to manage the weakness better now than he could before the injection.  He rates his pain today as a 5-10.  Pain is aggravated with stretching, sitting, standing.  Pain is alleviated with sitting in different positions.    Patient is scheduled to begin physical therapy September 20.  He is taking gabapentin 6 and a milligrams 3 times daily.  He takes Tylenol and ibuprofen as needed.  He also uses cyclobenzaprine as needed.  Medications are somewhat helpful for pain.    Review of Systems:  Positive for numbness/tingling, weakness, wetting pants, headache, pain much worse at night.  Negative for loss of bowel/bladder control, inability to urinate, trip/stumble/falls medical to swallowing, difficulty with hand skills, fevers, unintentional weight loss.     Objective:   CONSTITUTIONAL:  Vital signs as above.  No acute distress.  The patient is well nourished and well groomed.  Patient is obese.  PSYCHIATRIC:  The patient is awake, alert, oriented to person, place and time.  The patient is answering questions appropriately with clear speech.  Normal affect.  HEENT: Normocephalic, atraumatic.  Sclera clear.    SKIN:  Skin over the face, posterior torso, bilateral upper and lower extremities is clean, dry, intact without rashes.  VASCULAR: No significant lower extremity edema.  MUSCULOSKELETAL:  Gait is non-antalgic.  Patient demonstrates slight weakness heel walking on the right.  Able to toe walk bilaterally with no difficulty.  Mild tenderness over the right greater than left lumbar paraspinal muscles.      The patient has 4/5 right and 4+/5 left EHL, otherwise 5/5 strength for the bilateral hip flexors, knee flexors/extensors, ankle dorsiflexors/plantar flexors.  NEUROLOGICAL: 1+ patellar, achilles reflexes which are symmetric bilaterally.  No ankle clonus bilaterally.  Diminished  sensation right L5 and S1 dermatomes.     RESULTS:  I reviewed the report of an MRI lumbar spine from [facility] from August 19, 2021.  I do not have the actual images to review today.  At L4-5 there is a inferiorly directed right sided disc extrusion extending to L5-S1 which compresses the right L5 nerve root in the lateral recess and causes severe spinal stenosis.  At L5-S1 the caudal margin of the L4-5 disc extrusion contributes to moderate to severe thecal sac stenosis.  Please see report for further details.

## 2021-09-09 NOTE — PATIENT INSTRUCTIONS
A referral was entered to see [facility].  They will call you to schedule an appointment.  Ifyou do not hear from them within 72 hrs, please call [phone] to schedule an appointment.

## 2021-09-16 DIAGNOSIS — M54.9 BACK PAIN: Primary | ICD-10-CM

## 2021-09-17 ENCOUNTER — OFFICE VISIT (OUTPATIENT)
Dept: NEUROSURGERY | Facility: CLINIC | Age: 36
End: 2021-09-17
Attending: PHYSICIAN ASSISTANT
Payer: COMMERCIAL

## 2021-09-17 ENCOUNTER — HOSPITAL ENCOUNTER (OUTPATIENT)
Dept: RADIOLOGY | Facility: HOSPITAL | Age: 36
Discharge: HOME | End: 2021-09-17
Attending: NEUROLOGICAL SURGERY | Admitting: NEUROLOGICAL SURGERY
Payer: COMMERCIAL

## 2021-09-17 VITALS
BODY MASS INDEX: 49.28 KG/M2 | DIASTOLIC BLOOD PRESSURE: 85 MMHG | HEART RATE: 70 BPM | HEIGHT: 67 IN | WEIGHT: 314 LBS | OXYGEN SATURATION: 95 % | SYSTOLIC BLOOD PRESSURE: 139 MMHG

## 2021-09-17 DIAGNOSIS — M51.16 HERNIATION OF LUMBAR INTERVERTEBRAL DISC WITH RADICULOPATHY: Primary | ICD-10-CM

## 2021-09-17 DIAGNOSIS — M54.9 BACK PAIN: ICD-10-CM

## 2021-09-17 PROCEDURE — 2894A VOIDCORRECT: CPT | Performed by: NEUROLOGICAL SURGERY

## 2021-09-17 PROCEDURE — 72120 X-RAY BEND ONLY L-S SPINE: CPT

## 2021-09-17 PROCEDURE — 99215 OFFICE O/P EST HI 40 MIN: CPT | Performed by: NEUROLOGICAL SURGERY

## 2021-09-17 ASSESSMENT — MIFFLIN-ST. JEOR: SCORE: 2307.92

## 2021-09-17 NOTE — NURSING NOTE
Neurosurgery consultation was requested by: VEDA Alvarado  Pain: low back pain   Radicular Pain is present: pain in buttock and down posterior back bilateral leg to knee   Lhermitte sign: no   Motor complaints: denies weakness   Sensory complaints: numbness shin in right leg   Gait and balance issues: denies   Bowel or bladder issues: yes   Duration of SX is: 2 months   The symptoms are worse with: interchangeable   The symptoms are better with: nothing   Injury: denies   Severity is: mild - moderate   Patient has tried the following conservative measures: He is starting PT next Monday. He had a Right L45 and L5-S1 TFESI and had 90%  Relief for a little over 1 week.   ASHLEY score is:16%  MARSHA Yan

## 2021-09-17 NOTE — LETTER
9/17/2021         RE: Marylou Simms  [address]  [address]  [place] [address]        Dear Colleague,    Thank you for referring your patient, Marylou Simms, to the [facility]. Please see a copy of my visit note below.    Delete; duplicate error    NEUROSURGERY CONSULTATION NOTE    9/17/2021       CHIEF COMPLAINT: Lumbar disc herniation with radiculopathy    HPI:    Marylou Simms is a 36 year old male who is sent to us in consultation by Katya Chao PA-C for evaluation of lumbar radiculopathy and neurogenic claudication secondary to lumbar disc herniation.       Onset: 8/9/2021- without obvious inciting incident  Character: Pain in the low back which radiates into the lower buttocks on both sides and then there is radiating pain down the posterior thighs to the level of the knee. Initially, symptoms were isolated to the right leg but have since (over the last 3-4 weeks) began involving both legs    Worst pain: 8/10 - every morning  Average pain: 4-6/10    Numbness/tingling: Constant involving the anterior and lateral right shin extending onto the top of the right foot and bottoms of the toes.  Weakness: Denies    Aggravating factors: Going from laying down to standing in the morning is the absolute worst; prolonged sitting or standing  Relieving factors: Changing positionings can help, warm shower    Pain management: Gabpentin, tylenol, ibuprofen; had a right L4-5, L5-S1 TF LYLE on 8/24/2021 which offered benefit for about a week- when it started to wear off then he began experiencing symptoms in the left leg as well. Flexeril PRN is intermittently helpful. Is signed up for physical therapy and he begins on Monday.    Past Medical History:   Diagnosis Date     Back pain      Gout      Past Surgical History:   Procedure Laterality Date     WISDOM TOOTH EXTRACTION       REVIEW OF SYSTEMS:  Pt denies changes in bowel or bladder habits. No full loss of bladder control but he has had some  leakage when he is ready to void. A full 14 point review of systems was otherwise completed and is negative aside from that mentioned above in the HPI    MEDICATIONS:    Current Outpatient Medications   Medication Sig Dispense Refill     allopurinol (ZYLOPRIM) 300 MG tablet Take 300 mg by mouth       atropine 1 % ophthalmic solution INSTILL 1 DROP IN BOTH EYES EVERY DAY       cetirizine (ZYRTEC) 10 MG tablet [CETIRIZINE (ZYRTEC) 10 MG TABLET] Take 10 mg by mouth daily.       colchicine (COLCYRS) 0.6 MG tablet        cyclobenzaprine (FLEXERIL) 10 MG tablet        gabapentin (NEURONTIN) 300 MG capsule Take 1 capsule (300 mg) by mouth 3 times daily 90 capsule 0         ALLERGIES/SENSITIVITIES:     No Known Allergies    PERTINENT SOCIAL HISTORY:   Social History     Socioeconomic History     Marital status: Single     Spouse name: None     Number of children: None     Years of education: None     Highest education level: None   Occupational History     None   Tobacco Use     Smoking status: Former Smoker     Smokeless tobacco: Never Used   Substance and Sexual Activity     Alcohol use: None     Drug use: None     Sexual activity: None   Other Topics Concern     Parent/sibling w/ CABG, MI or angioplasty before 65F 55M? Not Asked   Social History Narrative     None     Social Determinants of Health     Financial Resource Strain:      Difficulty of Paying Living Expenses:    Food Insecurity:      Worried About Running Out of Food in the Last Year:      Ran Out of Food in the Last Year:    Transportation Needs:      Lack of Transportation (Medical):      Lack of Transportation (Non-Medical):    Physical Activity:      Days of Exercise per Week:      Minutes of Exercise per Session:    Stress:      Feeling of Stress :    Social Connections:      Frequency of Communication with Friends and Family:      Frequency of Social Gatherings with Friends and Family:      Attends Christianity Services:      Active Member of Clubs or  "Organizations:      Attends Club or Organization Meetings:      Marital Status:    Intimate Partner Violence:      Fear of Current or Ex-Partner:      Emotionally Abused:      Physically Abused:      Sexually Abused:          FAMILY HISTORY:  Family History   Problem Relation Age of Onset     Coronary Artery Disease Father         PHYSICAL EXAM:     Constitution: /85   Pulse 70   Ht 5' 7\" (1.702 m)   Wt 314 lb (142.4 kg)   SpO2 95%   BMI 49.18 kg/m  .   Awake, alert and in NAD  Eyes: Conjugate gaze. Conjunctiva benign without icterus or injection  Heart: RRR  Lungs: Non-labored respiration without accessory muscle use  Skin: No obvious rash or lesion  Psych: Appropriate mood and affect, alert and oriented x 3  Mental Status:  Speech is fluent.  Recent and remote memory are intact.  Attention span and concentration are normal.     Motor: Normal bulk and tone all muscle groups of upper and lower extremities.    Right Left   Hip flexion 5 5   Hip extension 5 5   Knee flexion 5* 5   Knee ex 5 5   Dorsiflex 5* 5   Plantar flex 5 5   EHL 5* 5    *- subtle hesitancy but no overt weakness    Sensory: Sensation intact bilaterally to light touch and temperature throughout. Sensation is intact to pinprick in the bilateral LE. Vibratory sense is intact in the bl great toe.     Coordination:  Gait is mildly antalic. Pt is able to heel and toe walk without difficulty. With single leg stand, he can pull himself onto his toes and heels. Tandem gait is WNL.     Reflexes; 2+ supinator, biceps, triceps. 2+ patellar and unable to elicit achilles. No miner. No clonus. Toes are down-going bilaterally    Musculoskeletal: Positive straight leg raise bilaterally. Negative BARTOLOME testing.     IMAGING: I personally reviewed all radiographic images    MRI lumbar spine 8/19/2021: Patient has a large right eccentric disc herniation at L4-5 which is causing severe lateral recess as well as central canal stenosis.  There is a large " caudally extruded herniation fragment that extends from the L4-5 disc level essentially down to the L5-S1 disc level where it contributes to severe central canal stenosis through this segment.      Lumbar spine flexion-extension x-rays 9/17/2021: No evidence of significant spondylolisthesis, no dynamic instability.      CONSULTATION ASSESSMENT AND PLAN:    Marylou Simms is a 36 year old male who has a large L4-5 disc herniation with symptoms of right lumbar radiculopathy as well as neurogenic claudication    I reviewed the natural history of disc herniations with Marylou and his wife today in clinic.  I offered a right L4-5 hemilaminectomy with microscopic discectomy and we discussed the risks, benefits, alternatives to surgery.  I offered second opinion with my surgical partners.  The patient will contact us if he decides to proceed with surgical intervention.    I spent more than 60 minutes in this apt, examining the pt, reviewing the scans, reviewing notes from chart, discussing treatment options with risks and benefits and coordinating care. >50 % clinic time was spent in face to face counseling and coordinating care    Sandi Thomas MD       Cc:   Freddy Vila          Again, thank you for allowing me to participate in the care of your patient.        Sincerely,        Sandi Thomas MD

## 2021-09-17 NOTE — PROGRESS NOTES
NEUROSURGERY CONSULTATION NOTE    9/17/2021       CHIEF COMPLAINT: Lumbar disc herniation with radiculopathy    HPI:    Marylou Simms is a 36 year old male who is sent to us in consultation by Katya Chao PA-C for evaluation of lumbar radiculopathy and neurogenic claudication secondary to lumbar disc herniation.       Onset: 8/9/2021- without obvious inciting incident  Character: Pain in the low back which radiates into the lower buttocks on both sides and then there is radiating pain down the posterior thighs to the level of the knee. Initially, symptoms were isolated to the right leg but have since (over the last 3-4 weeks) began involving both legs    Worst pain: 8/10 - every morning  Average pain: 4-6/10    Numbness/tingling: Constant involving the anterior and lateral right shin extending onto the top of the right foot and bottoms of the toes.  Weakness: Denies    Aggravating factors: Going from laying down to standing in the morning is the absolute worst; prolonged sitting or standing  Relieving factors: Changing positionings can help, warm shower    Pain management: Gabpentin, tylenol, ibuprofen; had a right L4-5, L5-S1 TF LYLE on 8/24/2021 which offered benefit for about a week- when it started to wear off then he began experiencing symptoms in the left leg as well. Flexeril PRN is intermittently helpful. Is signed up for physical therapy and he begins on Monday.    Past Medical History:   Diagnosis Date     Back pain      Gout      Past Surgical History:   Procedure Laterality Date     WISDOM TOOTH EXTRACTION       REVIEW OF SYSTEMS:  Pt denies changes in bowel or bladder habits. No full loss of bladder control but he has had some leakage when he is ready to void. A full 14 point review of systems was otherwise completed and is negative aside from that mentioned above in the HPI    MEDICATIONS:    Current Outpatient Medications   Medication Sig Dispense Refill     allopurinol (ZYLOPRIM) 300 MG tablet Take 300 mg  by mouth       atropine 1 % ophthalmic solution INSTILL 1 DROP IN BOTH EYES EVERY DAY       cetirizine (ZYRTEC) 10 MG tablet [CETIRIZINE (ZYRTEC) 10 MG TABLET] Take 10 mg by mouth daily.       colchicine (COLCYRS) 0.6 MG tablet        cyclobenzaprine (FLEXERIL) 10 MG tablet        gabapentin (NEURONTIN) 300 MG capsule Take 1 capsule (300 mg) by mouth 3 times daily 90 capsule 0         ALLERGIES/SENSITIVITIES:     No Known Allergies    PERTINENT SOCIAL HISTORY:   Social History     Socioeconomic History     Marital status: Single     Spouse name: None     Number of children: None     Years of education: None     Highest education level: None   Occupational History     None   Tobacco Use     Smoking status: Former Smoker     Smokeless tobacco: Never Used   Substance and Sexual Activity     Alcohol use: None     Drug use: None     Sexual activity: None   Other Topics Concern     Parent/sibling w/ CABG, MI or angioplasty before 65F 55M? Not Asked   Social History Narrative     None     Social Determinants of Health     Financial Resource Strain:      Difficulty of Paying Living Expenses:    Food Insecurity:      Worried About Running Out of Food in the Last Year:      Ran Out of Food in the Last Year:    Transportation Needs:      Lack of Transportation (Medical):      Lack of Transportation (Non-Medical):    Physical Activity:      Days of Exercise per Week:      Minutes of Exercise per Session:    Stress:      Feeling of Stress :    Social Connections:      Frequency of Communication with Friends and Family:      Frequency of Social Gatherings with Friends and Family:      Attends Muslim Services:      Active Member of Clubs or Organizations:      Attends Club or Organization Meetings:      Marital Status:    Intimate Partner Violence:      Fear of Current or Ex-Partner:      Emotionally Abused:      Physically Abused:      Sexually Abused:          FAMILY HISTORY:  Family History   Problem Relation Age of Onset  "    Coronary Artery Disease Father         PHYSICAL EXAM:     Constitution: /85   Pulse 70   Ht 5' 7\" (1.702 m)   Wt 314 lb (142.4 kg)   SpO2 95%   BMI 49.18 kg/m  .   Awake, alert and in NAD  Eyes: Conjugate gaze. Conjunctiva benign without icterus or injection  Heart: RRR  Lungs: Non-labored respiration without accessory muscle use  Skin: No obvious rash or lesion  Psych: Appropriate mood and affect, alert and oriented x 3  Mental Status:  Speech is fluent.  Recent and remote memory are intact.  Attention span and concentration are normal.     Motor: Normal bulk and tone all muscle groups of upper and lower extremities.    Right Left   Hip flexion 5 5   Hip extension 5 5   Knee flexion 5* 5   Knee ex 5 5   Dorsiflex 5* 5   Plantar flex 5 5   EHL 5* 5    *- subtle hesitancy but no overt weakness    Sensory: Sensation intact bilaterally to light touch and temperature throughout. Sensation is intact to pinprick in the bilateral LE. Vibratory sense is intact in the bl great toe.     Coordination:  Gait is mildly antalic. Pt is able to heel and toe walk without difficulty. With single leg stand, he can pull himself onto his toes and heels. Tandem gait is WNL.     Reflexes; 2+ supinator, biceps, triceps. 2+ patellar and unable to elicit achilles. No miner. No clonus. Toes are down-going bilaterally    Musculoskeletal: Positive straight leg raise bilaterally. Negative BARTOLOME testing.     IMAGING: I personally reviewed all radiographic images    MRI lumbar spine 8/19/2021: Patient has a large right eccentric disc herniation at L4-5 which is causing severe lateral recess as well as central canal stenosis.  There is a large caudally extruded herniation fragment that extends from the L4-5 disc level essentially down to the L5-S1 disc level where it contributes to severe central canal stenosis through this segment.      Lumbar spine flexion-extension x-rays 9/17/2021: No evidence of significant spondylolisthesis, " no dynamic instability.      CONSULTATION ASSESSMENT AND PLAN:    Marylou Simms is a 36 year old male who has a large L4-5 disc herniation with symptoms of right lumbar radiculopathy as well as neurogenic claudication    I reviewed the natural history of disc herniations with Marylou and his wife today in clinic.  I offered a right L4-5 hemilaminectomy with microscopic discectomy and we discussed the risks, benefits, alternatives to surgery.  I offered second opinion with my surgical partners.  The patient will contact us if he decides to proceed with surgical intervention.    I spent more than 60 minutes in this apt, examining the pt, reviewing the scans, reviewing notes from chart, discussing treatment options with risks and benefits and coordinating care. >50 % clinic time was spent in face to face counseling and coordinating care    Sandi Thomas MD       Cc:   Freddy Vila

## 2021-09-17 NOTE — PATIENT INSTRUCTIONS
Dr. Thomas offered a Right L45 Tera laminectomy and microdiskectomy.     Call if you would like to proceed.   Call if you would like to see one of Dr. Thomas's other partners.

## 2021-09-20 ENCOUNTER — HOSPITAL ENCOUNTER (OUTPATIENT)
Dept: PHYSICAL THERAPY | Facility: REHABILITATION | Age: 36
End: 2021-09-20
Payer: COMMERCIAL

## 2021-09-20 DIAGNOSIS — M54.42 ACUTE BILATERAL LOW BACK PAIN WITH BILATERAL SCIATICA: Primary | ICD-10-CM

## 2021-09-20 DIAGNOSIS — M51.16 HERNIATION OF LUMBAR INTERVERTEBRAL DISC WITH RADICULOPATHY: ICD-10-CM

## 2021-09-20 DIAGNOSIS — M54.41 ACUTE BILATERAL LOW BACK PAIN WITH BILATERAL SCIATICA: Primary | ICD-10-CM

## 2021-09-20 DIAGNOSIS — R29.898 RIGHT LEG WEAKNESS: ICD-10-CM

## 2021-09-20 PROCEDURE — 97162 PT EVAL MOD COMPLEX 30 MIN: CPT | Mod: GP | Performed by: PHYSICAL THERAPIST

## 2021-09-20 PROCEDURE — 97140 MANUAL THERAPY 1/> REGIONS: CPT | Mod: GP | Performed by: PHYSICAL THERAPIST

## 2021-09-20 PROCEDURE — 97110 THERAPEUTIC EXERCISES: CPT | Mod: GP | Performed by: PHYSICAL THERAPIST

## 2021-09-20 NOTE — DISCHARGE INSTRUCTIONS
LOWER TRUNK ROTATIONS - LTR    Lying on your back with your knees bent, gently move your knees side-to-side.  Hold 10 seconds, then rotate to the other side.   Repeat 5 times.       Prone lying    Lie on your stomach. If tolerated, put a pillow under your chest.     Hold for 2-3 minutes.   Repeat every 2 hours if able.      Wearing Kinesio tape  Kinesio Tape is designed to gently create forces on the skin's surface, elevating the tissue and relieving pressure. Taping over and around musclesprovides support and assistance to muscle, or can help prevent over-contraction, while still allowing full range of motion. Kinesio Tape also help facilitate lymphatic flow, blood circulation, and normal function of theneurological system, which are all vital to healthy tissue.     Kinesio Tape can be worn for 3 days.    Tape can be worn for showering or bathing. The tape is made of cottonfabric with a waterproof acrylic adhesive. It is latex free.    After showering or bathing, pat the tape dry with a towel. Do not rub over the tape, as this will cause the edges to roll and peel.     Allow the tape to airdry for about 20 minutes. Do not use a hair dryer.    If a section of tape rolls or peels away, trim it off with a scissors.   Please note if there are changes in your symptoms while wearing Kinesio Tapeand share this information with your therapist at your next appointment.   Remove the tape if your pain level increases or you experience itching or skin irritation.
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
## 2021-09-20 NOTE — ADDENDUM NOTE
Encounter addended by: Samantha Bennett, PT on: 9/20/2021 4:33 PM   Actions taken: Flowsheet accepted, Clinical Note Signed

## 2021-09-20 NOTE — PROGRESS NOTES
"   09/20/21 1500   Signing Clinician's Name / Credentials   Signing clinician's name / credentials Samantha Bennett, PT   Session Number   Session Number 1/12   Ortho Goal 1   Goal Identifier HEP   Goal Description patient will be indepedent with home exercise program and self management in 8-10 weeks.    Ortho Goal 2   Goal Identifier standing   Goal Description able to stand for 1 hour without increased pain in 8-10 weeks.    Ortho Goal 3   Goal Identifier bending   Goal Description able to bend to dress lower body without pain in 8 weeks   Ortho Goal 4   Goal Identifier work   Goal Description patient will return to normal work duties without restrictions in 8-10 weeks.    Therapeutic Procedure/exercise   Therapeutic Procedures: strength, endurance, ROM, flexibillity minutes (50067) 15   Skilled Intervention ther ex, kinesio tape   Patient Response increased (L) LE pain with SHARIFA. Patient instructed in tape wear, issued handout.    Treatment Detail prone lying 2-3 min, supine LTR 5 x 5-10\", seated slump sliders 8-10 x, kinesio tape - lumbar disc herniation.    Manual Therapy   Manual Therapy: Mobilization, MFR, MLD, friction massage minutes (82389) 10   Skilled Intervention MFR, counterstrain    Patient Response good   Treatment Detail (B) EPIL3-5-LV,    Education   Learner Patient   Readiness Eager   Method Booklet/handout;Demonstration   Response Demonstrates Understanding   Plan   Home program supine LTR, prone lying, seated slump slider, kinesio tape   Plan for next session Continue with POC, including progression of HEP, manual therapy, kinesio tape, posture education   Comments   Comments ref provider: Katya Chao PA-C   Total Session Time   Timed Code Treatment Minutes 25   Total Treatment Time (sum of timed and untimed services) 25   AMBULATORY CLINICS ONLY-MEDICAL AND TREATMENT DIAGNOSIS   Medical Diagnosis Herniation of lumbar intervertebral disc with radiculopathy   PT Diagnosis acute (B) LBP w/(B) sciatica, " (R) LE weakness

## 2021-10-17 ENCOUNTER — HEALTH MAINTENANCE LETTER (OUTPATIENT)
Age: 36
End: 2021-10-17

## 2021-10-21 ENCOUNTER — HOSPITAL ENCOUNTER (OUTPATIENT)
Dept: PHYSICAL THERAPY | Facility: REHABILITATION | Age: 36
End: 2021-10-21
Payer: COMMERCIAL

## 2021-10-21 DIAGNOSIS — M51.16 HERNIATION OF LUMBAR INTERVERTEBRAL DISC WITH RADICULOPATHY: Primary | ICD-10-CM

## 2021-10-21 DIAGNOSIS — M54.42 ACUTE BILATERAL LOW BACK PAIN WITH BILATERAL SCIATICA: ICD-10-CM

## 2021-10-21 DIAGNOSIS — R29.898 RIGHT LEG WEAKNESS: ICD-10-CM

## 2021-10-21 DIAGNOSIS — M54.41 ACUTE BILATERAL LOW BACK PAIN WITH BILATERAL SCIATICA: ICD-10-CM

## 2021-10-21 PROCEDURE — 97110 THERAPEUTIC EXERCISES: CPT | Mod: GP | Performed by: PHYSICAL THERAPIST

## 2021-10-26 ENCOUNTER — HOSPITAL ENCOUNTER (OUTPATIENT)
Dept: PHYSICAL THERAPY | Facility: REHABILITATION | Age: 36
End: 2021-10-26
Payer: COMMERCIAL

## 2021-10-26 DIAGNOSIS — M51.16 HERNIATION OF LUMBAR INTERVERTEBRAL DISC WITH RADICULOPATHY: Primary | ICD-10-CM

## 2021-10-26 DIAGNOSIS — M54.41 ACUTE BILATERAL LOW BACK PAIN WITH BILATERAL SCIATICA: ICD-10-CM

## 2021-10-26 DIAGNOSIS — M54.42 ACUTE BILATERAL LOW BACK PAIN WITH BILATERAL SCIATICA: ICD-10-CM

## 2021-10-26 DIAGNOSIS — R29.898 RIGHT LEG WEAKNESS: ICD-10-CM

## 2021-10-26 PROCEDURE — 97110 THERAPEUTIC EXERCISES: CPT | Mod: GP | Performed by: PHYSICAL THERAPIST

## 2021-11-18 NOTE — PROGRESS NOTES
[facility]    Outpatient Physical Therapy Discharge Note  Patient: Marylou Simms  : 1985    Beginning/End Dates of Reporting Period:  21 to 10/26/21    Referring Provider: Katya Chao PA-C     Therapy Diagnosis: low back pain      Client Self Report: Will contact Katya after this appoitnment.  0/10 pain today.  90% improvement.  Still has a little bit of achiness after sleeping.      Objective Measurements:                                          Outcome Measures (most recent score):    none  Goals:  Goal Identifier HEP   Goal Description patient will be indepedent with home exercise program and self management in 8-10 weeks.    Target Date 21   Date Met  10/21/21   Progress (detail required for progress note):       Goal Identifier standing   Goal Description able to stand for 1 hour without increased pain in 8-10 weeks.    Target Date 21   Date Met  10/26/21   Progress (detail required for progress note): Pt has been standing quite a bit lately.  Working at the hospital.  - MET      Goal Identifier bending   Goal Description able to bend to dress lower body without pain in 8 weeks   Target Date 21   Date Met  10/26/21   Progress (detail required for progress note): MET      Goal Identifier work   Goal Description patient will return to normal work duties without restrictions in 8-10 weeks.    Target Date 21   Date Met  10/26/21   Progress (detail required for progress note): Pt will contact Pt has returned to full work duties        Goal Identifier     Goal Description     Target Date     Date Met      Progress (detail required for progress note):       Goal Identifier     Goal Description     Target Date     Date Met      Progress (detail required for progress note):       Goal Identifier     Goal Description     Target Date     Date Met      Progress (detail required for  progress note):       Goal Identifier     Goal Description     Target Date     Date Met      Progress (detail required for progress note):             Plan:  Discharge from therapy.    Discharge:    Reason for Discharge: Patient has met all goals.        Discharge Plan: Patient to continue home program.

## 2021-11-18 NOTE — ADDENDUM NOTE
Encounter addended by: Lola Alfaro, PT on: 11/18/2021 1:21 PM   Actions taken: Episode resolved, Clinical Note Signed, Flowsheet accepted

## 2021-11-29 ENCOUNTER — LAB (OUTPATIENT)
Dept: LAB | Facility: HOSPITAL | Age: 36
End: 2021-11-29
Payer: COMMERCIAL

## 2021-11-29 DIAGNOSIS — H20.043 SECONDARY NONINFECTIOUS IRIDOCYCLITIS OF BOTH EYES: Primary | ICD-10-CM

## 2021-11-29 LAB
ALBUMIN SERPL-MCNC: 4 G/DL (ref 3.5–5)
ALP SERPL-CCNC: 59 U/L (ref 45–120)
ALT SERPL W P-5'-P-CCNC: 54 U/L (ref 0–45)
ANION GAP SERPL CALCULATED.3IONS-SCNC: 6 MMOL/L (ref 5–18)
AST SERPL W P-5'-P-CCNC: 29 U/L (ref 0–40)
BILIRUB SERPL-MCNC: 0.4 MG/DL (ref 0–1)
BUN SERPL-MCNC: 13 MG/DL (ref 8–22)
CALCIUM SERPL-MCNC: 9.6 MG/DL (ref 8.5–10.5)
CHLORIDE BLD-SCNC: 106 MMOL/L (ref 98–107)
CO2 SERPL-SCNC: 26 MMOL/L (ref 22–31)
CREAT SERPL-MCNC: 1.14 MG/DL (ref 0.7–1.3)
GFR SERPL CREATININE-BSD FRML MDRD: 82 ML/MIN/1.73M2
GLUCOSE BLD-MCNC: 118 MG/DL (ref 70–125)
POTASSIUM BLD-SCNC: 3.7 MMOL/L (ref 3.5–5)
PROT SERPL-MCNC: 7.7 G/DL (ref 6–8)
SODIUM SERPL-SCNC: 138 MMOL/L (ref 136–145)

## 2021-11-29 PROCEDURE — 86617 LYME DISEASE ANTIBODY: CPT

## 2021-11-29 PROCEDURE — 84999 UNLISTED CHEMISTRY PROCEDURE: CPT

## 2021-11-29 PROCEDURE — 80053 COMPREHEN METABOLIC PANEL: CPT

## 2021-11-29 PROCEDURE — 86780 TREPONEMA PALLIDUM: CPT

## 2021-11-29 PROCEDURE — 36415 COLL VENOUS BLD VENIPUNCTURE: CPT

## 2021-11-30 LAB
Lab: NORMAL
PERFORMING LABORATORY: NORMAL
SPECIMEN STATUS: NORMAL
TEST NAME: NORMAL

## 2021-12-02 LAB
B BURGDOR IGG SER QL IB: NEGATIVE
B BURGDOR IGM SER QL IB: NEGATIVE

## 2021-12-03 LAB — MISCELLANEOUS TEST 1 (ARUP): NORMAL

## 2022-10-02 ENCOUNTER — HEALTH MAINTENANCE LETTER (OUTPATIENT)
Age: 37
End: 2022-10-02

## 2023-10-21 ENCOUNTER — HEALTH MAINTENANCE LETTER (OUTPATIENT)
Age: 38
End: 2023-10-21

## 2025-04-21 ENCOUNTER — APPOINTMENT (OUTPATIENT)
Dept: CT IMAGING | Facility: HOSPITAL | Age: 40
DRG: 378 | End: 2025-04-21
Attending: EMERGENCY MEDICINE
Payer: COMMERCIAL

## 2025-04-21 ENCOUNTER — ANESTHESIA EVENT (OUTPATIENT)
Dept: SURGERY | Facility: HOSPITAL | Age: 40
End: 2025-04-21
Payer: COMMERCIAL

## 2025-04-21 ENCOUNTER — APPOINTMENT (OUTPATIENT)
Dept: CARDIOLOGY | Facility: HOSPITAL | Age: 40
DRG: 378 | End: 2025-04-21
Attending: HOSPITALIST
Payer: COMMERCIAL

## 2025-04-21 ENCOUNTER — HOSPITAL ENCOUNTER (INPATIENT)
Facility: HOSPITAL | Age: 40
LOS: 2 days | Discharge: HOME | DRG: 378 | End: 2025-04-23
Attending: EMERGENCY MEDICINE | Admitting: STUDENT IN AN ORGANIZED HEALTH CARE EDUCATION/TRAINING PROGRAM
Payer: COMMERCIAL

## 2025-04-21 DIAGNOSIS — K92.2 UPPER GI BLEED: ICD-10-CM

## 2025-04-21 DIAGNOSIS — D64.9 ANEMIA, UNSPECIFIED TYPE: ICD-10-CM

## 2025-04-21 LAB
ABO + RH BLD: NORMAL
ALBUMIN SERPL BCG-MCNC: 3.7 G/DL (ref 3.5–5.2)
ALP SERPL-CCNC: 54 U/L (ref 40–150)
ALT SERPL W P-5'-P-CCNC: 33 U/L (ref 0–70)
ANION GAP SERPL CALCULATED.3IONS-SCNC: 9 MMOL/L (ref 7–15)
APTT PPP: 26 SECONDS (ref 22–38)
AST SERPL W P-5'-P-CCNC: 19 U/L (ref 0–45)
BASOPHILS # BLD AUTO: 0 10E3/UL (ref 0–0.2)
BASOPHILS NFR BLD AUTO: 0 %
BILIRUB DIRECT SERPL-MCNC: 0.14 MG/DL (ref 0–0.3)
BILIRUB SERPL-MCNC: 0.5 MG/DL
BLD GP AB SCN SERPL QL: NEGATIVE
BLD PROD TYP BPU: NORMAL
BLOOD COMPONENT TYPE: NORMAL
BUN SERPL-MCNC: 12.7 MG/DL (ref 6–20)
CALCIUM SERPL-MCNC: 8.4 MG/DL (ref 8.8–10.4)
CHLORIDE SERPL-SCNC: 106 MMOL/L (ref 98–107)
CODING SYSTEM: NORMAL
CREAT SERPL-MCNC: 1.02 MG/DL (ref 0.67–1.17)
CROSSMATCH: NORMAL
EGFRCR SERPLBLD CKD-EPI 2021: >90 ML/MIN/1.73M2
EOSINOPHIL # BLD AUTO: 0.2 10E3/UL (ref 0–0.7)
EOSINOPHIL NFR BLD AUTO: 3 %
ERYTHROCYTE [DISTWIDTH] IN BLOOD BY AUTOMATED COUNT: 14.2 % (ref 10–15)
GLUCOSE SERPL-MCNC: 125 MG/DL (ref 70–99)
HCO3 SERPL-SCNC: 27 MMOL/L (ref 22–29)
HCT VFR BLD AUTO: 17.9 % (ref 40–53)
HGB BLD-MCNC: 5.2 G/DL (ref 13.3–17.7)
HGB BLD-MCNC: 7 G/DL (ref 13.3–17.7)
HGB BLD-MCNC: 7.6 G/DL (ref 13.3–17.7)
HOLD SPECIMEN: NORMAL
IMM GRANULOCYTES # BLD: 0.3 10E3/UL
IMM GRANULOCYTES NFR BLD: 4 %
INR PPP: 1.01 (ref 0.85–1.15)
ISSUE DATE AND TIME: NORMAL
LVEF ECHO: NORMAL
LYMPHOCYTES # BLD AUTO: 2.1 10E3/UL (ref 0.8–5.3)
LYMPHOCYTES NFR BLD AUTO: 25 %
MAGNESIUM SERPL-MCNC: 2.2 MG/DL (ref 1.7–2.3)
MCH RBC QN AUTO: 25.2 PG (ref 26.5–33)
MCHC RBC AUTO-ENTMCNC: 29.1 G/DL (ref 31.5–36.5)
MCV RBC AUTO: 87 FL (ref 78–100)
MONOCYTES # BLD AUTO: 0.6 10E3/UL (ref 0–1.3)
MONOCYTES NFR BLD AUTO: 7 %
NEUTROPHILS # BLD AUTO: 5.3 10E3/UL (ref 1.6–8.3)
NEUTROPHILS NFR BLD AUTO: 62 %
NRBC # BLD AUTO: 0.1 10E3/UL
NRBC BLD AUTO-RTO: 2 /100
PLATELET # BLD AUTO: 212 10E3/UL (ref 150–450)
POTASSIUM SERPL-SCNC: 4 MMOL/L (ref 3.4–5.3)
PROT SERPL-MCNC: 6 G/DL (ref 6.4–8.3)
RBC # BLD AUTO: 2.06 10E6/UL (ref 4.4–5.9)
SODIUM SERPL-SCNC: 142 MMOL/L (ref 135–145)
SPECIMEN EXP DATE BLD: NORMAL
TROPONIN T SERPL HS-MCNC: 23 NG/L
TROPONIN T SERPL HS-MCNC: 25 NG/L
UNIT ABO/RH: NORMAL
UNIT NUMBER: NORMAL
UNIT STATUS: NORMAL
UNIT TYPE ISBT: 6200
UNIT TYPE ISBT: 8400
UNIT TYPE ISBT: 8400
WBC # BLD AUTO: 8.5 10E3/UL (ref 4–11)

## 2025-04-21 PROCEDURE — 93005 ELECTROCARDIOGRAM TRACING: CPT | Performed by: EMERGENCY MEDICINE

## 2025-04-21 PROCEDURE — 36430 TRANSFUSION BLD/BLD COMPNT: CPT

## 2025-04-21 PROCEDURE — 85018 HEMOGLOBIN: CPT | Performed by: HOSPITALIST

## 2025-04-21 PROCEDURE — 99291 CRITICAL CARE FIRST HOUR: CPT | Mod: 25

## 2025-04-21 PROCEDURE — 96376 TX/PRO/DX INJ SAME DRUG ADON: CPT | Mod: 59

## 2025-04-21 PROCEDURE — 86923 COMPATIBILITY TEST ELECTRIC: CPT | Performed by: EMERGENCY MEDICINE

## 2025-04-21 PROCEDURE — 85730 THROMBOPLASTIN TIME PARTIAL: CPT | Performed by: EMERGENCY MEDICINE

## 2025-04-21 PROCEDURE — P9016 RBC LEUKOCYTES REDUCED: HCPCS | Performed by: EMERGENCY MEDICINE

## 2025-04-21 PROCEDURE — 93306 TTE W/DOPPLER COMPLETE: CPT | Mod: 26 | Performed by: INTERNAL MEDICINE

## 2025-04-21 PROCEDURE — 84155 ASSAY OF PROTEIN SERUM: CPT | Performed by: EMERGENCY MEDICINE

## 2025-04-21 PROCEDURE — 120N000001 HC R&B MED SURG/OB

## 2025-04-21 PROCEDURE — P9016 RBC LEUKOCYTES REDUCED: HCPCS | Performed by: STUDENT IN AN ORGANIZED HEALTH CARE EDUCATION/TRAINING PROGRAM

## 2025-04-21 PROCEDURE — 83735 ASSAY OF MAGNESIUM: CPT | Performed by: HOSPITALIST

## 2025-04-21 PROCEDURE — 258N000003 HC RX IP 258 OP 636: Performed by: INTERNAL MEDICINE

## 2025-04-21 PROCEDURE — 250N000011 HC RX IP 250 OP 636: Performed by: EMERGENCY MEDICINE

## 2025-04-21 PROCEDURE — 250N000011 HC RX IP 250 OP 636: Performed by: INTERNAL MEDICINE

## 2025-04-21 PROCEDURE — 86923 COMPATIBILITY TEST ELECTRIC: CPT | Performed by: STUDENT IN AN ORGANIZED HEALTH CARE EDUCATION/TRAINING PROGRAM

## 2025-04-21 PROCEDURE — 96366 THER/PROPH/DIAG IV INF ADDON: CPT

## 2025-04-21 PROCEDURE — 86901 BLOOD TYPING SEROLOGIC RH(D): CPT | Performed by: EMERGENCY MEDICINE

## 2025-04-21 PROCEDURE — 74177 CT ABD & PELVIS W/CONTRAST: CPT

## 2025-04-21 PROCEDURE — 99222 1ST HOSP IP/OBS MODERATE 55: CPT | Performed by: HOSPITALIST

## 2025-04-21 PROCEDURE — 36415 COLL VENOUS BLD VENIPUNCTURE: CPT | Performed by: EMERGENCY MEDICINE

## 2025-04-21 PROCEDURE — G0378 HOSPITAL OBSERVATION PER HR: HCPCS

## 2025-04-21 PROCEDURE — 255N000002 HC RX 255 OP 636: Performed by: HOSPITALIST

## 2025-04-21 PROCEDURE — 96365 THER/PROPH/DIAG IV INF INIT: CPT

## 2025-04-21 PROCEDURE — 85025 COMPLETE CBC W/AUTO DIFF WBC: CPT | Performed by: EMERGENCY MEDICINE

## 2025-04-21 PROCEDURE — 84484 ASSAY OF TROPONIN QUANT: CPT | Performed by: EMERGENCY MEDICINE

## 2025-04-21 PROCEDURE — 85610 PROTHROMBIN TIME: CPT | Performed by: EMERGENCY MEDICINE

## 2025-04-21 PROCEDURE — 82310 ASSAY OF CALCIUM: CPT | Performed by: EMERGENCY MEDICINE

## 2025-04-21 PROCEDURE — 96374 THER/PROPH/DIAG INJ IV PUSH: CPT | Mod: 59

## 2025-04-21 PROCEDURE — 250N000013 HC RX MED GY IP 250 OP 250 PS 637: Performed by: HOSPITALIST

## 2025-04-21 PROCEDURE — 36415 COLL VENOUS BLD VENIPUNCTURE: CPT | Performed by: HOSPITALIST

## 2025-04-21 RX ORDER — ACETAMINOPHEN 325 MG/1
650 TABLET ORAL EVERY 4 HOURS PRN
Status: DISCONTINUED | OUTPATIENT
Start: 2025-04-21 | End: 2025-04-23 | Stop reason: HOSPADM

## 2025-04-21 RX ORDER — ACETAMINOPHEN 650 MG/1
650 SUPPOSITORY RECTAL EVERY 4 HOURS PRN
Status: DISCONTINUED | OUTPATIENT
Start: 2025-04-21 | End: 2025-04-23 | Stop reason: HOSPADM

## 2025-04-21 RX ORDER — AMOXICILLIN 250 MG
1 CAPSULE ORAL 2 TIMES DAILY PRN
Status: DISCONTINUED | OUTPATIENT
Start: 2025-04-21 | End: 2025-04-23 | Stop reason: HOSPADM

## 2025-04-21 RX ORDER — ACETAMINOPHEN 500 MG
1000 TABLET ORAL EVERY 6 HOURS PRN
COMMUNITY

## 2025-04-21 RX ORDER — ALLOPURINOL 300 MG/1
300 TABLET ORAL DAILY
Status: DISCONTINUED | OUTPATIENT
Start: 2025-04-21 | End: 2025-04-23 | Stop reason: HOSPADM

## 2025-04-21 RX ORDER — ONDANSETRON 4 MG/1
4 TABLET, ORALLY DISINTEGRATING ORAL EVERY 6 HOURS PRN
Status: DISCONTINUED | OUTPATIENT
Start: 2025-04-21 | End: 2025-04-23 | Stop reason: HOSPADM

## 2025-04-21 RX ORDER — CALCIUM CARBONATE 500 MG/1
1000 TABLET, CHEWABLE ORAL 2 TIMES DAILY PRN
Status: DISCONTINUED | OUTPATIENT
Start: 2025-04-21 | End: 2025-04-23 | Stop reason: HOSPADM

## 2025-04-21 RX ORDER — BISACODYL 10 MG
10 SUPPOSITORY, RECTAL RECTAL DAILY PRN
Status: DISCONTINUED | OUTPATIENT
Start: 2025-04-21 | End: 2025-04-23 | Stop reason: HOSPADM

## 2025-04-21 RX ORDER — HYDRALAZINE HYDROCHLORIDE 20 MG/ML
10 INJECTION INTRAMUSCULAR; INTRAVENOUS EVERY 4 HOURS PRN
Status: DISCONTINUED | OUTPATIENT
Start: 2025-04-21 | End: 2025-04-23 | Stop reason: HOSPADM

## 2025-04-21 RX ORDER — IBUPROFEN 600 MG/1
600 TABLET, FILM COATED ORAL EVERY 6 HOURS PRN
Status: ON HOLD | COMMUNITY
End: 2025-04-23

## 2025-04-21 RX ORDER — ONDANSETRON 2 MG/ML
4 INJECTION INTRAMUSCULAR; INTRAVENOUS EVERY 6 HOURS PRN
Status: DISCONTINUED | OUTPATIENT
Start: 2025-04-21 | End: 2025-04-23 | Stop reason: HOSPADM

## 2025-04-21 RX ORDER — HYDRALAZINE HYDROCHLORIDE 10 MG/1
10 TABLET, FILM COATED ORAL EVERY 4 HOURS PRN
Status: DISCONTINUED | OUTPATIENT
Start: 2025-04-21 | End: 2025-04-23 | Stop reason: HOSPADM

## 2025-04-21 RX ORDER — POLYETHYLENE GLYCOL 3350 17 G/17G
17 POWDER, FOR SOLUTION ORAL 2 TIMES DAILY PRN
Status: DISCONTINUED | OUTPATIENT
Start: 2025-04-21 | End: 2025-04-23 | Stop reason: HOSPADM

## 2025-04-21 RX ORDER — CYCLOBENZAPRINE HCL 10 MG
10 TABLET ORAL 3 TIMES DAILY PRN
Status: DISCONTINUED | OUTPATIENT
Start: 2025-04-21 | End: 2025-04-23 | Stop reason: HOSPADM

## 2025-04-21 RX ORDER — LIDOCAINE 40 MG/G
CREAM TOPICAL
Status: DISCONTINUED | OUTPATIENT
Start: 2025-04-21 | End: 2025-04-23 | Stop reason: HOSPADM

## 2025-04-21 RX ORDER — AMOXICILLIN 250 MG
2 CAPSULE ORAL 2 TIMES DAILY PRN
Status: DISCONTINUED | OUTPATIENT
Start: 2025-04-21 | End: 2025-04-23 | Stop reason: HOSPADM

## 2025-04-21 RX ORDER — IOPAMIDOL 755 MG/ML
90 INJECTION, SOLUTION INTRAVASCULAR ONCE
Status: COMPLETED | OUTPATIENT
Start: 2025-04-21 | End: 2025-04-21

## 2025-04-21 RX ADMIN — IOPAMIDOL 90 ML: 755 INJECTION, SOLUTION INTRAVENOUS at 09:47

## 2025-04-21 RX ADMIN — PANTOPRAZOLE SODIUM 40 MG: 40 INJECTION, POWDER, FOR SOLUTION INTRAVENOUS at 09:01

## 2025-04-21 RX ADMIN — PANTOPRAZOLE SODIUM 40 MG: 40 INJECTION, POWDER, FOR SOLUTION INTRAVENOUS at 12:34

## 2025-04-21 RX ADMIN — ALLOPURINOL 300 MG: 300 TABLET ORAL at 16:11

## 2025-04-21 RX ADMIN — SODIUM CHLORIDE 8 MG/HR: 9 INJECTION, SOLUTION INTRAVENOUS at 16:11

## 2025-04-21 RX ADMIN — SODIUM CHLORIDE 8 MG/HR: 9 INJECTION, SOLUTION INTRAVENOUS at 12:34

## 2025-04-21 RX ADMIN — PERFLUTREN 3 ML: 6.52 INJECTION, SUSPENSION INTRAVENOUS at 14:40

## 2025-04-21 ASSESSMENT — ENCOUNTER SYMPTOMS: NAUSEA: 1

## 2025-04-21 ASSESSMENT — ACTIVITIES OF DAILY LIVING (ADL)
ADLS_ACUITY_SCORE: 41

## 2025-04-21 ASSESSMENT — COLUMBIA-SUICIDE SEVERITY RATING SCALE - C-SSRS
6. HAVE YOU EVER DONE ANYTHING, STARTED TO DO ANYTHING, OR PREPARED TO DO ANYTHING TO END YOUR LIFE?: NO
1. IN THE PAST MONTH, HAVE YOU WISHED YOU WERE DEAD OR WISHED YOU COULD GO TO SLEEP AND NOT WAKE UP?: NO
2. HAVE YOU ACTUALLY HAD ANY THOUGHTS OF KILLING YOURSELF IN THE PAST MONTH?: NO

## 2025-04-21 NOTE — PHARMACY-ADMISSION MEDICATION HISTORY
Pharmacist Admission Medication History    Admission medication history is complete. The information provided in this note is only as accurate as the sources available at the time of the update.    Information Source(s): Patient and CareEverywhere/SureScripts via in-person    Pertinent Information: The patient tapered off prednisolone eye drops. He stopped taking prednisone tablets a couple of weeks ago. Gabapentin made him drowsy so this was changed to ibuprofen 600mg. He has not taken any in about a week    Changes made to PTA medication list:  Added: ibuprofen, Tylenol  Deleted: atropine eye drops, gabapentin  Changed: cetirizine to PRN    Allergies reviewed with patient and updates made in EHR: yes    Medication History Completed By: Melissa Thacker RPH 4/21/2025 9:37 AM    PTA Med List   Medication Sig Last Dose/Taking    acetaminophen (TYLENOL) 500 MG tablet Take 1,000 mg by mouth every 6 hours as needed for mild pain. Unknown    allopurinol (ZYLOPRIM) 300 MG tablet Take 300 mg by mouth daily. 4/18/2025 Morning    cetirizine (ZYRTEC) 10 MG tablet Take 10 mg by mouth daily as needed for allergies. Unknown    colchicine (COLCYRS) 0.6 MG tablet Take 0.6 mg by mouth 2 times daily as needed for gout pain. Unknown    cyclobenzaprine (FLEXERIL) 10 MG tablet Take 10 mg by mouth 3 times daily. 4/18/2025    ibuprofen (ADVIL/MOTRIN) 600 MG tablet Take 600 mg by mouth every 6 hours as needed for moderate pain. Past Week

## 2025-04-21 NOTE — ED TRIAGE NOTES
Presents with subjective fever and black stool x 1 week. Endorsing low back pain that he has had for 1 year, rated 6/10. Also has nausea without vomiting. Tachypneic at 32-36resp/min. Ambulatory.

## 2025-04-21 NOTE — ED NOTES
D: RN placed a second IV, blood and protonix IV ordered. Pt completed 1 unit at this time. Pt sats dorp 80's while sleeping, no known HX of sleep apnea.  A: MD informed  R: Pt on 2 LNC at this time.

## 2025-04-21 NOTE — LETTER
[place] [address]  [address] [facility] [address] [address]  Phone: [phone]  Fax: [phone]    April 23, 2025        Marylou PORTILLO  [place] [address]          To whom it may concern:    RE: Marylou Simms was admitted to [facility] from 4/21/25 - 4/23/25 for emergent medical care. Please excuse him from work for these days. He should remain home to rest and recover through 4/25/25. He can return to work without restrictions on 4/26/25.    Please contact me for questions or concerns.      Sincerely,      Abhi Back MD

## 2025-04-21 NOTE — PROGRESS NOTES
Care management/social work IP consult received for discharge planning.  Discussed with provider, pt is independent at baseline and MD does not anticipate any discharge planning needs. Determined to be an inappropriate care management consult.  Consult order discontinued per discussion.      Emely Alcantara, LGSW

## 2025-04-21 NOTE — PLAN OF CARE
Goal Outcome Evaluation:      Plan of Care Reviewed With: patient        Problem: Gastrointestinal Bleeding  Goal: Hemostasis  Outcome: Progressing  2 units of blood administered. Hemoglobin checks ordered every 4 hours.   Pt. States he feels less short of breath.     Anjana Seals RN

## 2025-04-21 NOTE — ED PROVIDER NOTES
Emergency Department Encounter     Evaluation Date & Time:   No admission date for patient encounter.    CHIEF COMPLAINT:  Melena and Fever      Triage Note:       ED COURSE & MEDICAL DECISION MAKING:     Pt here with family for evaluation of dark stools for the past week or so, feeling hot at times, but no documented fever at home and afebrile here.  Pt reports feeling more lightheaded/weak, dyspneic. Denies any real chest pain or pleuritic pain. Review of EMR shows office visit from 3/19/25 where pt was seen for chronic low back pain that he's had recurrence since 9/2024.  Pt was on prednisone/ibuprofen for this, but hasn't taken either in a week or so.  Does have some occasional abdominal discomfort, but none currently.  Intact pulses. Somewhat dark brown stool on rectal exam.  No anticoagulation, no hx of GI bleeding. Will get labs, EKG. Suspect symptomatic upper GI bleed given previous nsaid/prednisone use for his chronic back pain with new dark stools, dyspnea. He is hemodynamically stable and no bright red bleeding to warrant CTA abdomen.    ED Course as of 04/21/25 1058   Mon Apr 21, 2025   0905 Lab called and Hgb is 5.2.  Will consent for blood, order 2 units PRBCs to start.   0918 Pt updated on results, need for transfusion, signed consent for blood with nurse as witness.    He last ate food around 1530 yesterday and had some juice this AM around 0700.   0932 Initial trop 23, delta ordered. Rest of labs relatively reassuring.    We have no inpatient beds here, so will see if we can transfer pt.   1010 Pt updated on bed situation, agreeable to transfer if bed available elsewhere in system. Otherwise, may board here.  I placed pt in transfer portal now.  Charge updated as well.   1038 Bed reportedly available at [facility] this afternoon. Will discuss with pt.   1040 Pt declined transfer to [facility], so will board here as a result.     1041 CT abd neg.    Pt updated.  HR 70s.   1046 Paged paged [facility]   1045  "Spoke with [facility] Dr Rodríguez.  Will see in consultation.   1050 I spoke with hospitalist, Dr. Farr, who accepts for inpatient admission for GI bleed.         Medical Decision Making  I reviewed the EMR: Outpatient Record: Clinic visit 3/19/25  Care impacted by prednisone/nsaid use for back pain and Chronic Pain  Admit.    MIPS (CTPE, Dental pain, Snyder, Sinusitis, Asthma/COPD, Head Trauma): Not Applicable    SEPSIS: None          MEDICATIONS GIVEN IN THE EMERGENCY DEPARTMENT:  Medications   pantoprazole (PROTONIX) IV push injection 40 mg (40 mg Intravenous $Given 4/21/25 0901)   iopamidol (ISOVUE-370) solution 90 mL (90 mLs Intravenous $Given 4/21/25 0947)       NEW PRESCRIPTIONS STARTED AT TODAY'S ED VISIT:  New Prescriptions    No medications on file       HPI   HPI     Marylou Simms is a 39 year old male with a pertinent history of back pain, who presents to this ED via walk in for evaluation of a subjective fever and black stool.     For the past few months he has endorsed ongoing lower back pain, he was seen in clinic 33 days ago (03/19/2025) for this pain where he was prescribed prednisone, muscle relaxer, ibuprofen. He denies being on a steroid medication since ~1 week ago. He has endorsed recent subjective fevers, dizziness, lightheadedness, and shortness of breath with associated chest pain while \"trying to catch my breath\". He has endorsed recent dark red blood in his stool. Earlier today (04/21/2025) he endorsed worsened fatigue prompting him to present to the emergency department. To manage his symptoms, 2 days ago (04/19/2025) he took 2 tums. He denies recent injury or fall. He denies being anticoagulated. He denies prior GI bleed or transfusions. He denies abdominal pain, cough, urinary symptoms, or any other symptoms at this time.      Per chart review:  03/19/2025 (33 days ago) [facility] visit for evaluation of persistent back pain radiating down his right leg and tingling in his " left leg, which he attributed to a recurrence of sciatica that began in 09/2024. The pain, initially manageable, had significantly worsened since 3 weeks prior, causing him to take increased Tylenol.  He had not undergone any surgical interventions for his back but received injections in 2021. His last MRI was conducted 4 years prior. He had not tried prednisone. He had previously participated in physical therapy for his back but not recently. Patient was discharged in stable condition with prescribed prednisone, muscle relaxer, ibuprofen.     Per chart review:  03/28/2025 (24 days ago) [facility] outpatient imaging visit with done MRI Lumbar Spine w/o contrast ordered from prior 03/18/2025 clinic visit. Impression read: 1.  Transitional lumbosacral anatomy with partially sacralized L5 segment forming a pseudoarticulation on the right.   2.  Multilevel lumbar spondylosis as detailed above. Superimposed epidural lipomatosis in the lower lumbar spine has progressed since 2021.   3.  At L4-L5, there has been interval decrease in size of a large inferiorly dissecting right paracentral disc extrusion, which now only reaches the level of the L5 superior endplate (previously tracking to the L5-S1 level). In combination with prominent dorsal epidural fat and facet hypertrophy, advanced spinal canal stenosis persists at this level.   4.  Moderate to advanced thecal sac narrowing at L5-S1 secondary to circumferential epidural lipomatosis.   5.  No high-grade neural foraminal stenosis.       REVIEW OF SYSTEMS:  See HPI      Medical History     Past Medical History:   Diagnosis Date    Back pain     Gout        Past Surgical History:   Procedure Laterality Date    WISDOM TOOTH EXTRACTION         Family History   Problem Relation Age of Onset    Coronary Artery Disease Father        Social History     Tobacco Use    Smoking status: Former     Current packs/day: 0.00     Types: Cigarettes     Quit date: 2009     Years since  quittin.3    Smokeless tobacco: Never   Substance Use Topics    Alcohol use: Yes     Comment: 3-4/day    Drug use: Never       acetaminophen (TYLENOL) 500 MG tablet  allopurinol (ZYLOPRIM) 300 MG tablet  cetirizine (ZYRTEC) 10 MG tablet  colchicine (COLCYRS) 0.6 MG tablet  cyclobenzaprine (FLEXERIL) 10 MG tablet  ibuprofen (ADVIL/MOTRIN) 600 MG tablet        Physical Exam     Vitals:  /55   Pulse 74   Temp 98.1  F (36.7  C) (Oral)   Resp 24   Wt (!) 151.5 kg (334 lb 1.6 oz)   SpO2 97%   BMI 52.33 kg/m      PHYSICAL EXAM:   Physical Exam  Vitals and nursing note reviewed.   Constitutional:       General: He is not in acute distress.     Appearance: Normal appearance. He is morbidly obese.   HENT:      Head: Normocephalic and atraumatic.      Nose: Nose normal.      Mouth/Throat:      Mouth: Mucous membranes are moist.      Comments: Somewhat pale mucosa.  Eyes:      Pupils: Pupils are equal, round, and reactive to light.   Cardiovascular:      Rate and Rhythm: Normal rate and regular rhythm.      Pulses: Normal pulses.           Radial pulses are 2+ on the right side and 2+ on the left side.        Dorsalis pedis pulses are 2+ on the right side and 2+ on the left side.   Pulmonary:      Effort: Pulmonary effort is normal. No respiratory distress.      Breath sounds: Normal breath sounds.   Abdominal:      Palpations: Abdomen is soft.      Tenderness: There is no abdominal tenderness.   Genitourinary:     Rectum: External hemorrhoid (Non bleeding) present.      Comments: Dark brown stool present. No bright red blood. No obvious melena.   Musculoskeletal:      Cervical back: Full passive range of motion without pain and neck supple.      Comments: No calf tenderness or swelling b/l   Skin:     General: Skin is warm.      Findings: No rash.   Neurological:      General: No focal deficit present.      Mental Status: He is alert. Mental status is at baseline.      Comments: Fluent speech, no acute  lateralizing deficits   Psychiatric:         Mood and Affect: Mood normal.         Behavior: Behavior normal.         Results     LAB:  All pertinent labs reviewed and interpreted  Labs Ordered and Resulted from Time of ED Arrival to Time of ED Departure   BASIC METABOLIC PANEL - Abnormal       Result Value    Sodium 142      Potassium 4.0      Chloride 106      Carbon Dioxide (CO2) 27      Anion Gap 9      Urea Nitrogen 12.7      Creatinine 1.02      GFR Estimate >90      Calcium 8.4 (*)     Glucose 125 (*)    HEPATIC FUNCTION PANEL - Abnormal    Protein Total 6.0 (*)     Albumin 3.7      Bilirubin Total 0.5      Alkaline Phosphatase 54      AST 19      ALT 33      Bilirubin Direct 0.14     TROPONIN T, HIGH SENSITIVITY - Abnormal    Troponin T, High Sensitivity 23 (*)    CBC WITH PLATELETS AND DIFFERENTIAL - Abnormal    WBC Count 8.5      RBC Count 2.06 (*)     Hemoglobin 5.2 (*)     Hematocrit 17.9 (*)     MCV 87      MCH 25.2 (*)     MCHC 29.1 (*)     RDW 14.2      Platelet Count 212      % Neutrophils 62      % Lymphocytes 25      % Monocytes 7      % Eosinophils 3      % Basophils 0      % Immature Granulocytes 4      NRBCs per 100 WBC 2 (*)     Absolute Neutrophils 5.3      Absolute Lymphocytes 2.1      Absolute Monocytes 0.6      Absolute Eosinophils 0.2      Absolute Basophils 0.0      Absolute Immature Granulocytes 0.3      Absolute NRBCs 0.1     INR - Normal    INR 1.01     PARTIAL THROMBOPLASTIN TIME - Normal    aPTT 26     TROPONIN T, HIGH SENSITIVITY   TYPE AND SCREEN, ADULT    ABO/RH(D) AB POS      Antibody Screen Negative      SPECIMEN EXPIRATION DATE [id number]     PREPARE RED BLOOD CELLS (UNIT)    Blood Component Type Red Blood Cells      Product Code H5013U40      Unit Status Ready for issue      Unit Number [id number]      CROSSMATCH Compatible      CODING SYSTEM [id number]     PREPARE RED BLOOD CELLS (UNIT)    Blood Component Type Red Blood Cells      Product Code E3114V89      Unit Status  Transfused      Unit Number [id number]      CROSSMATCH Compatible      CODING SYSTEM [id number]      ISSUE DATE AND TIME [id number]      UNIT ABO/RH AB+      UNIT TYPE ISBT 8400     PREPARE RED BLOOD CELLS (UNIT)   TRANSFUSE RED BLOOD CELLS (UNIT)   ABO/RH TYPE AND SCREEN       RADIOLOGY:  CT Abdomen Pelvis w Contrast   Final Result   IMPRESSION:    1.  No acute abnormality in the abdomen or pelvis.   2.  Mild hepatic steatosis.                   ECG:  NSR, rate 77, normal intervals, no acute ischemia, no previous for comparison    I have independently reviewed and interpreted the EKG(s) documented above     PROCEDURES:  Procedures:  none      FINAL IMPRESSION:    ICD-10-CM    1. Upper GI bleed  K92.2       2. Anemia, unspecified type  D64.9         CRITICAL CARE  40 minutes of critical care time spent managing GI bleed with need for transfusion of blood. Time spent interpreting labs, speaking with pt and consultants, as well as documenting.  Independent of any procedures performed.        I, Peterson Dalton, am serving as a scribe to document services personally performed by Dr. Scooter Campos, based on my observations and the provider's statements to me. I, Scooter Campos, DO attest that Peterson Dalton is acting in a scribe capacity, has observed my performance of the services and has documented them in accordance with my direction.      Scooter Campos DO  Emergency Medicine  [facility] EMERGENCY DEPARTMENT  4/21/2025  8:32 AM         Scooter Campos MD  04/21/25 1058

## 2025-04-21 NOTE — CONSULTS
[facility] DIGESTIVE [facility] CONSULTATION    Marylou Simms    [address]  [place] [address]  39 year old male    Admission Date/Time: 2025  8:34 AM    Primary Care Provider:  Freddy Vila    Requesting Physician: Scooter Campos MD    CHIEF COMPLAINT:   Dark stools and fatigue    REASON FOR THE CONSULT: Melena, acute blood loss anemia    HPI:   Marylou Simms is a 39 year old male with history of sciatica, who presents with melena.  Reports that he was in his usual state of health until last  when he started having black tarry stools.  Reports no other GI symptoms however progressively he started feeling weak and fatigued.  Denies any abdominal pain.  He did feel slightly nauseous.  No vomiting.  He reports that he has been using ibuprofen 1200 mg daily for the past 2 to 3 weeks for the management of sciatica as gabapentin was making him very drowsy.  He did use some prednisone for a week last month.  No previous GI bleeding.  No abdominal surgeries.  No blood thinner use.  No prior endoscopy or colonoscopy.  On presentation he was noted to have hemoglobin of 5.2.  CT of the abdomen pelvis with no acute findings.  Steatosis noted.    REVIEW OF SYSTEMS:  Review of Systems   Constitutional:  Positive for malaise/fatigue.   Gastrointestinal:  Positive for melena and nausea.   All other systems reviewed and are negative.      PAST MEDICAL HISTORY:  Past Medical History:   Diagnosis Date    Back pain     Gout        PAST SURGICAL HISTORY:  Past Surgical History:   Procedure Laterality Date    WISDOM TOOTH EXTRACTION     No abdominal surgeries.    FAMILY HISTORY:  Family History   Problem Relation Age of Onset    Coronary Artery Disease Father        SOCIAL HISTORY:  Social History     Tobacco Use    Smoking status: Former     Current packs/day: 0.00     Types: Cigarettes     Quit date:      Years since quittin.3    Smokeless tobacco: Never   Substance Use Topics    Alcohol use: Yes     Comment:  3-4/day       ALLERGIES/SENSITIVITIES:  Seasonal allergies    MEDICATIONS:  Current Outpatient Medications   Medication Sig Dispense Refill    acetaminophen (TYLENOL) 500 MG tablet Take 1,000 mg by mouth every 6 hours as needed for mild pain.      allopurinol (ZYLOPRIM) 300 MG tablet Take 300 mg by mouth daily.      cetirizine (ZYRTEC) 10 MG tablet Take 10 mg by mouth daily as needed for allergies.      colchicine (COLCYRS) 0.6 MG tablet Take 0.6 mg by mouth 2 times daily as needed for gout pain.      cyclobenzaprine (FLEXERIL) 10 MG tablet Take 10 mg by mouth 3 times daily.      ibuprofen (ADVIL/MOTRIN) 600 MG tablet Take 600 mg by mouth every 6 hours as needed for moderate pain.           PHYSICAL EXAM:  /55   Pulse 74   Temp 98.1  F (36.7  C) (Oral)   Resp 24   Wt (!) 151.5 kg (334 lb 1.6 oz)   SpO2 97%   BMI 52.33 kg/m    Body mass index is 52.33 kg/m .  General: A&O, NAD, non-toxic appearing  Eyes: no icterus or conjunctivitis  ENT: oropharynx clear without ulcerations  Neck/Thyroid: supple, no masses  Pulmonary: CTA B  Cardiovascular: RR, S1, S2  Gastrointestinal: Soft, NTTP, no masses  Skin: no jaundice/petechiae/rashes  Lymph nodes: No cervical or supraclavicular lymphadenopathy  Extremities: No edema      LABORATORY DATA:  CBC:  Recent Labs   Lab Test 04/21/25  0847   WBC 8.5   RBC 2.06*   HGB 5.2*   HCT 17.9*   MCV 87   MCH 25.2*   MCHC 29.1*   RDW 14.2           BMP:  Recent Labs   Lab 04/21/25  0847      POTASSIUM 4.0   CHLORIDE 106   CO2 27   *   CR 1.02   BUN 12.7       INR:  Recent Labs   Lab Test 04/21/25  0847   INR 1.01       Liver and Pancreas panel:  Recent Labs   Lab 04/21/25  0847   AST 19   ALT 33   ALKPHOS 54   BILITOTAL 0.5         IMAGING:    CT Abdomen Pelvis w Contrast    Result Date: 4/21/2025  EXAM: CT ABDOMEN PELVIS W CONTRAST LOCATION: [facility] DATE: 4/21/2025 INDICATION: abdominal discomfort, dark stools COMPARISON: None.  TECHNIQUE: CT scan of the abdomen and pelvis was performed following injection of IV contrast. Multiplanar reformats were obtained. Dose reduction techniques were used. CONTRAST: IsoVue 370 90mL FINDINGS: LOWER CHEST: Normal. HEPATOBILIARY: Mild hepatic steatosis. Gallbladder is unremarkable. PANCREAS: Normal. SPLEEN: Normal. ADRENAL GLANDS: Normal. KIDNEYS/BLADDER: Normal renal contours. No urolithiasis or hydronephrosis. Bladder is unremarkable. BOWEL: Normal caliber small and large bowel. Normal appendix. No free fluid or free air. LYMPH NODES: No lymphadenopathy. VASCULATURE: The abdominal aorta is nonaneurysmal. PELVIC ORGANS: Normal contours. MUSCULOSKELETAL: No worrisome bone lesions.     IMPRESSION: 1.  No acute abnormality in the abdomen or pelvis. 2.  Mild hepatic steatosis.      CC: [facility], Freddy Vila    ASSESSMENT AND RECOMMENDATIONS:   Marylou Simms  is a 39 year old male with:  Melena likely due to upper GI bleeding.  Acute blood loss anemia.  Excessive NSAID use.  Sciatica.    Differential diagnosis of upper GI bleeding in the patient include NSAIDs induced ulcer, gastritis, peptic ulcer disease, etc.    Recommendations:  Please Keep NPO.  Maintain adequate IV access with two large bore cannulas.  Switch Protonix to IV drip.  Liquid diet for today.  Transfuse PRBC per the primary team to achieve hemoglobin above 7.  We will arrange for upper endoscopy for tomorrow in the OR. Patient was verbally consented. Risks of procedure was discussed including sedation related risks, infection, aspiration, bleeding and perforation and all questions were answered.   If patient becomes hemodynamically unstable, transfer to the ICU, consult with IR and contact GI service.             Austyn Rodríguez MD  Thank you for the opportunity to participate in the care of this patient.   Please feel free to contact us with any questions or concerns.  Phone number [phone] ext [phone].

## 2025-04-21 NOTE — PLAN OF CARE
"  Problem: Adult Inpatient Plan of Care  Goal: Plan of Care Review  Description: The Plan of Care Review/Shift note should be completed every shift.  The Outcome Evaluation is a brief statement about your assessment that the patient is improving, declining, or no change.  This information will be displayed automatically on your shiftnote.  Outcome: Progressing  Goal: Patient-Specific Goal (Individualized)  Description: You can add care plan individualizations to a care plan. Examples of Individualization might be:  \"Parent requests to be called daily at 9am for status\", \"I have a hard time hearing out of my right ear\", or \"Do not touch me to wake me up as it startlesme\".  Outcome: Progressing  Goal: Absence of Hospital-Acquired Illness or Injury  Outcome: Progressing  Intervention: Identify and Manage Fall Risk  Recent Flowsheet Documentation  Taken 4/21/2025 1300 by Freddy Uriostegui RN  Safety Promotion/Fall Prevention: activity supervised  Intervention: Prevent Skin Injury  Recent Flowsheet Documentation  Taken 4/21/2025 1300 by Freddy Uriostegui RN  Body Position: position changed independently  Goal: Optimal Comfort and Wellbeing  Outcome: Progressing  Goal: Readiness for Transition of Care  Outcome: Progressing   Goal Outcome Evaluation:                    Pt is alert and oriented x4. Pt is med complain. Pt denies pain. Pt's v/s is stable. Pt received 2 units of blood this morning. No complain. Continue to monitor pt.    "

## 2025-04-21 NOTE — H&P
"Admission History and Physical   Marylou Simms  1985, MRN [id number]    [facility]    PCP: Freddy Vila, [phone]          Extended Emergency Contact Information  Primary Emergency Contact: Karri Pardo  Address: [address]           [place] [address] [place]  Home Phone: [phone]  Relation: Spouse       Assessment and Plan     39M recent 1200mg/day ibuprofen use, ~6beers/day on weekends a/w upper GIB.    #Upper GIB with acute blood loss anemia  -suspected PUD in setting of ibuprofen and alcohol use  -IV PPI, GI consult for EGD, NPO, Hb q4h and transfuse Hb < 7    #minimally elevated hsTrp - due to severe anemia. ECHO    #Lumbar stenosis due to disc herniation and lipomatosis  -follow-up with neurosx    #gout - allopurinol    #DIANA, likely - outpatient sleep referral    #morbid obesity Body mass index is 52.33 kg/m . - consideration for GLP1RA      Checklist:  Code Status:  full  Diet:      Snyder Catheter: Not present  Lines: None        Page 1 of 1        Auto-populated based on system request - if needs to be addressed in treatment plan they will be addressed above:  \"  Clinically Significant Risk Factors Present on Admission           # Hypocalcemia: Lowest Ca = 8.4 mg/dL in last 2 days, will monitor and replace as appropriate                  # Anemia: based on hgb <11  # Anemia: based on hgb <11                        \"  Advanced Care Planning  I anticipate the patient will be admitted to the hospital for at least 2 midnights for the evaluation and treatment of the conditions discussed above.     Chief Complaint: Dark stools, SOB     HPI:    Marylou Simms is a 39M on 1200mg/day for the last 3 weeks for sciatica a/w 1 week of dark stools and SHELTON.  Stopped NSAIDs aldrich dark stools started.  Also drinks about 6beers/day on the weekends.    In the ER:  Hb 5.2 and 2 units pRBC ordered.  Given protonix 40 IV    No chest or abdominal pain.  Feeling better after 1st unit of " blood.    History is provided by patient       Physical Exam:  Temp:  [97.8  F (36.6  C)-99.1  F (37.3  C)] 98.1  F (36.7  C)  Pulse:  [74-97] 74  Resp:  [21-32] 24  BP: (110-174)/(53-79) 119/55  SpO2:  [94 %-97 %] 97 %  /55   Pulse 74   Temp 98.1  F (36.7  C) (Oral)   Resp 24   Wt (!) 151.5 kg (334 lb 1.6 oz)   SpO2 97%   BMI 52.33 kg/m       General:  Alert, cooperative, no distress,  Appears stated age  Neurologic:  oriented, facialsymmetry preserved, fluent speech. Moves all 4 spontaneously  Psych: calm, mood and affect appropriate to situation  HEENT:  Anicteric, MMM, unremarkable dentition, NC as O2 dips while sleeping  CV: RRR no MRG, normal S1 and S2, no edema  Lungs: CTAB.  Easyrespirations  Abd: soft, obese, NT, normoactive BS  Skin: no rashes noted on exposed skin.   Central Lines and Tubes: None (no whitney, CVC, feeding tubes)         Pertinent Test Findings  Radiology Results (results reviewed):   Results for orders placed or performed during the hospital encounter of 25   CT Abdomen Pelvis w Contrast    Impression    IMPRESSION:   1.  No acute abnormality in the abdomen or pelvis.  2.  Mild hepatic steatosis.       EKG (personally interpreted): normal sinus rhythm and no acute ischemic changes      Medical History  Past Medical History:   Diagnosis Date    Back pain     Gout         Surgical History  Past Surgical History:   Procedure Laterality Date    WISDOM TOOTH EXTRACTION            Social History  Social History     Tobacco Use    Smoking status: Former     Current packs/day: 0.00     Types: Cigarettes     Quit date:      Years since quittin.3    Smokeless tobacco: Never   Substance Use Topics    Alcohol use: Yes     Comment: 3-4/day    Drug use: Never          Allergies  Allergies   Allergen Reactions    Seasonal Allergies     Family History  I have reviewed this patient's family history and updated it with pertinent information if needed.  Family History   Problem  Relation Age of Onset    Coronary Artery Disease Father                  Prior to Admission Medications   Prior to Admission Medications   Prescriptions Last Dose Informant Patient Reported? Taking?   acetaminophen (TYLENOL) 500 MG tablet Unknown  Yes Yes   Sig: Take 1,000 mg by mouth every 6 hours as needed for mild pain.   allopurinol (ZYLOPRIM) 300 MG tablet 4/18/2025 Morning  Yes Yes   Sig: Take 300 mg by mouth daily.   cetirizine (ZYRTEC) 10 MG tablet Unknown  Yes Yes   Sig: Take 10 mg by mouth daily as needed for allergies.   colchicine (COLCYRS) 0.6 MG tablet Unknown  Yes Yes   Sig: Take 0.6 mg by mouth 2 times daily as needed for gout pain.   cyclobenzaprine (FLEXERIL) 10 MG tablet 4/18/2025  Yes Yes   Sig: Take 10 mg by mouth 3 times daily.   ibuprofen (ADVIL/MOTRIN) 600 MG tablet Past Week  Yes Yes   Sig: Take 600 mg by mouth every 6 hours as needed for moderate pain.      Facility-Administered Medications: None              Pertinent Labs    Most Recent 3 CBC's:  Recent Labs   Lab Test 04/21/25  0847 08/06/20  1614   WBC 8.5 6.9   HGB 5.2* 16.2   MCV 87 87    190     Most Recent 3 BMP's:  Recent Labs   Lab Test 04/21/25  0847 11/29/21  1555    138   POTASSIUM 4.0 3.7   CHLORIDE 106 106   CO2 27 26   BUN 12.7 13   CR 1.02 1.14   ANIONGAP 9 6   JASWINDER 8.4* 9.6   * 118     Most Recent 2 LFT's:  Recent Labs   Lab Test 04/21/25  0847 11/29/21  1555   AST 19 29   ALT 33 54*   ALKPHOS 54 59   BILITOTAL 0.5 0.4     Most Recent 3 INR's:  Recent Labs   Lab Test 04/21/25  0847   INR 1.01     Most Recent 3 Troponin's:No lab results found.    Medical Decision Making        Medical Decision Making               Josse Farr MD, [facility]  Internal Medicine Hospitalist  4/21/2025

## 2025-04-22 ENCOUNTER — ANESTHESIA (OUTPATIENT)
Dept: SURGERY | Facility: HOSPITAL | Age: 40
End: 2025-04-22
Payer: COMMERCIAL

## 2025-04-22 LAB
ANION GAP SERPL CALCULATED.3IONS-SCNC: 10 MMOL/L (ref 7–15)
ATRIAL RATE - MUSE: 77 BPM
BUN SERPL-MCNC: 10.2 MG/DL (ref 6–20)
CALCIUM SERPL-MCNC: 8.6 MG/DL (ref 8.8–10.4)
CHLORIDE SERPL-SCNC: 106 MMOL/L (ref 98–107)
CREAT SERPL-MCNC: 1.09 MG/DL (ref 0.67–1.17)
DIASTOLIC BLOOD PRESSURE - MUSE: 59 MMHG
EGFRCR SERPLBLD CKD-EPI 2021: 88 ML/MIN/1.73M2
ERYTHROCYTE [DISTWIDTH] IN BLOOD BY AUTOMATED COUNT: 14.7 % (ref 10–15)
GLUCOSE SERPL-MCNC: 109 MG/DL (ref 70–99)
HCO3 SERPL-SCNC: 24 MMOL/L (ref 22–29)
HCT VFR BLD AUTO: 24.8 % (ref 40–53)
HGB BLD-MCNC: 7.5 G/DL (ref 13.3–17.7)
HGB BLD-MCNC: 7.5 G/DL (ref 13.3–17.7)
HGB BLD-MCNC: 8 G/DL (ref 13.3–17.7)
INTERPRETATION ECG - MUSE: NORMAL
MAGNESIUM SERPL-MCNC: 2.1 MG/DL (ref 1.7–2.3)
MCH RBC QN AUTO: 25.8 PG (ref 26.5–33)
MCHC RBC AUTO-ENTMCNC: 30.2 G/DL (ref 31.5–36.5)
MCV RBC AUTO: 85 FL (ref 78–100)
P AXIS - MUSE: 46 DEGREES
PLATELET # BLD AUTO: 204 10E3/UL (ref 150–450)
POTASSIUM SERPL-SCNC: 3.9 MMOL/L (ref 3.4–5.3)
PR INTERVAL - MUSE: 148 MS
QRS DURATION - MUSE: 94 MS
QT - MUSE: 392 MS
QTC - MUSE: 443 MS
R AXIS - MUSE: 63 DEGREES
RBC # BLD AUTO: 2.91 10E6/UL (ref 4.4–5.9)
SODIUM SERPL-SCNC: 140 MMOL/L (ref 135–145)
SYSTOLIC BLOOD PRESSURE - MUSE: 132 MMHG
T AXIS - MUSE: 61 DEGREES
UPPER GI ENDOSCOPY: NORMAL
VENTRICULAR RATE- MUSE: 77 BPM
WBC # BLD AUTO: 6.7 10E3/UL (ref 4–11)

## 2025-04-22 PROCEDURE — 120N000001 HC R&B MED SURG/OB

## 2025-04-22 PROCEDURE — 360N000075 HC SURGERY LEVEL 2, PER MIN: Performed by: INTERNAL MEDICINE

## 2025-04-22 PROCEDURE — 258N000003 HC RX IP 258 OP 636: Performed by: REGISTERED NURSE

## 2025-04-22 PROCEDURE — 272N000001 HC OR GENERAL SUPPLY STERILE: Performed by: INTERNAL MEDICINE

## 2025-04-22 PROCEDURE — 96366 THER/PROPH/DIAG IV INF ADDON: CPT | Mod: 59

## 2025-04-22 PROCEDURE — G0378 HOSPITAL OBSERVATION PER HR: HCPCS

## 2025-04-22 PROCEDURE — 250N000011 HC RX IP 250 OP 636: Performed by: INTERNAL MEDICINE

## 2025-04-22 PROCEDURE — 258N000003 HC RX IP 258 OP 636: Performed by: ANESTHESIOLOGY

## 2025-04-22 PROCEDURE — 85018 HEMOGLOBIN: CPT | Performed by: STUDENT IN AN ORGANIZED HEALTH CARE EDUCATION/TRAINING PROGRAM

## 2025-04-22 PROCEDURE — 36415 COLL VENOUS BLD VENIPUNCTURE: CPT | Performed by: HOSPITALIST

## 2025-04-22 PROCEDURE — 99233 SBSQ HOSP IP/OBS HIGH 50: CPT | Performed by: STUDENT IN AN ORGANIZED HEALTH CARE EDUCATION/TRAINING PROGRAM

## 2025-04-22 PROCEDURE — 250N000011 HC RX IP 250 OP 636: Performed by: REGISTERED NURSE

## 2025-04-22 PROCEDURE — 250N000009 HC RX 250: Performed by: REGISTERED NURSE

## 2025-04-22 PROCEDURE — 36415 COLL VENOUS BLD VENIPUNCTURE: CPT | Performed by: STUDENT IN AN ORGANIZED HEALTH CARE EDUCATION/TRAINING PROGRAM

## 2025-04-22 PROCEDURE — 250N000013 HC RX MED GY IP 250 OP 250 PS 637: Performed by: HOSPITALIST

## 2025-04-22 PROCEDURE — 0DB78ZX EXCISION OF STOMACH, PYLORUS, VIA NATURAL OR ARTIFICIAL OPENING ENDOSCOPIC, DIAGNOSTIC: ICD-10-PCS | Performed by: INTERNAL MEDICINE

## 2025-04-22 PROCEDURE — 85018 HEMOGLOBIN: CPT | Performed by: HOSPITALIST

## 2025-04-22 PROCEDURE — 80048 BASIC METABOLIC PNL TOTAL CA: CPT | Performed by: HOSPITALIST

## 2025-04-22 PROCEDURE — 83735 ASSAY OF MAGNESIUM: CPT | Performed by: HOSPITALIST

## 2025-04-22 PROCEDURE — 370N000017 HC ANESTHESIA TECHNICAL FEE, PER MIN: Performed by: INTERNAL MEDICINE

## 2025-04-22 PROCEDURE — 85014 HEMATOCRIT: CPT | Performed by: HOSPITALIST

## 2025-04-22 PROCEDURE — 250N000011 HC RX IP 250 OP 636: Performed by: STUDENT IN AN ORGANIZED HEALTH CARE EDUCATION/TRAINING PROGRAM

## 2025-04-22 PROCEDURE — 96376 TX/PRO/DX INJ SAME DRUG ADON: CPT

## 2025-04-22 PROCEDURE — 96366 THER/PROPH/DIAG IV INF ADDON: CPT

## 2025-04-22 PROCEDURE — 258N000003 HC RX IP 258 OP 636: Performed by: INTERNAL MEDICINE

## 2025-04-22 PROCEDURE — 88342 IMHCHEM/IMCYTCHM 1ST ANTB: CPT | Mod: TC | Performed by: INTERNAL MEDICINE

## 2025-04-22 PROCEDURE — 999N000141 HC STATISTIC PRE-PROCEDURE NURSING ASSESSMENT: Performed by: INTERNAL MEDICINE

## 2025-04-22 RX ORDER — LIDOCAINE 40 MG/G
CREAM TOPICAL
Status: DISCONTINUED | OUTPATIENT
Start: 2025-04-22 | End: 2025-04-22 | Stop reason: HOSPADM

## 2025-04-22 RX ORDER — ONDANSETRON 2 MG/ML
INJECTION INTRAMUSCULAR; INTRAVENOUS PRN
Status: DISCONTINUED | OUTPATIENT
Start: 2025-04-22 | End: 2025-04-22

## 2025-04-22 RX ORDER — PROPOFOL 10 MG/ML
INJECTION, EMULSION INTRAVENOUS CONTINUOUS PRN
Status: DISCONTINUED | OUTPATIENT
Start: 2025-04-22 | End: 2025-04-22

## 2025-04-22 RX ORDER — SODIUM CHLORIDE, SODIUM LACTATE, POTASSIUM CHLORIDE, CALCIUM CHLORIDE 600; 310; 30; 20 MG/100ML; MG/100ML; MG/100ML; MG/100ML
INJECTION, SOLUTION INTRAVENOUS CONTINUOUS
Status: DISCONTINUED | OUTPATIENT
Start: 2025-04-22 | End: 2025-04-22 | Stop reason: HOSPADM

## 2025-04-22 RX ORDER — PROPOFOL 10 MG/ML
INJECTION, EMULSION INTRAVENOUS PRN
Status: DISCONTINUED | OUTPATIENT
Start: 2025-04-22 | End: 2025-04-22

## 2025-04-22 RX ADMIN — PROPOFOL 30 MG: 10 INJECTION, EMULSION INTRAVENOUS at 07:50

## 2025-04-22 RX ADMIN — ALLOPURINOL 300 MG: 300 TABLET ORAL at 09:40

## 2025-04-22 RX ADMIN — PANTOPRAZOLE SODIUM 40 MG: 40 INJECTION, POWDER, FOR SOLUTION INTRAVENOUS at 20:53

## 2025-04-22 RX ADMIN — DEXMEDETOMIDINE HYDROCHLORIDE 12 MCG: 100 INJECTION, SOLUTION INTRAVENOUS at 07:41

## 2025-04-22 RX ADMIN — PROPOFOL 40 MG: 10 INJECTION, EMULSION INTRAVENOUS at 07:45

## 2025-04-22 RX ADMIN — PROPOFOL 100 MCG/KG/MIN: 10 INJECTION, EMULSION INTRAVENOUS at 07:42

## 2025-04-22 RX ADMIN — SODIUM CHLORIDE 8 MG/HR: 9 INJECTION, SOLUTION INTRAVENOUS at 09:45

## 2025-04-22 RX ADMIN — ONDANSETRON 4 MG: 2 INJECTION INTRAMUSCULAR; INTRAVENOUS at 07:55

## 2025-04-22 RX ADMIN — DEXMEDETOMIDINE HYDROCHLORIDE 8 MCG: 100 INJECTION, SOLUTION INTRAVENOUS at 07:48

## 2025-04-22 RX ADMIN — SODIUM CHLORIDE, SODIUM LACTATE, POTASSIUM CHLORIDE, AND CALCIUM CHLORIDE: .6; .31; .03; .02 INJECTION, SOLUTION INTRAVENOUS at 07:36

## 2025-04-22 ASSESSMENT — ACTIVITIES OF DAILY LIVING (ADL)
ADLS_ACUITY_SCORE: 41
ADLS_ACUITY_SCORE: 41
ADLS_ACUITY_SCORE: 25
ADLS_ACUITY_SCORE: 41
ADLS_ACUITY_SCORE: 25
ADLS_ACUITY_SCORE: 25
CHANGE_IN_FUNCTIONAL_STATUS_SINCE_ONSET_OF_CURRENT_ILLNESS/INJURY: NO
ADLS_ACUITY_SCORE: 25
ADLS_ACUITY_SCORE: 25
ADLS_ACUITY_SCORE: 22
ADLS_ACUITY_SCORE: 25
ADLS_ACUITY_SCORE: 25
ADLS_ACUITY_SCORE: 41
WALKING_OR_CLIMBING_STAIRS_DIFFICULTY: NO
DIFFICULTY_COMMUNICATING: NO
DIFFICULTY_EATING/SWALLOWING: NO
CONCENTRATING,_REMEMBERING_OR_MAKING_DECISIONS_DIFFICULTY: NO
ADLS_ACUITY_SCORE: 41
DOING_ERRANDS_INDEPENDENTLY_DIFFICULTY: NO
ADLS_ACUITY_SCORE: 22
WEAR_GLASSES_OR_BLIND: YES
ADLS_ACUITY_SCORE: 25
DRESSING/BATHING_DIFFICULTY: NO
ADLS_ACUITY_SCORE: 25
ADLS_ACUITY_SCORE: 25
ADLS_ACUITY_SCORE: 41
TOILETING_ISSUES: NO
ADLS_ACUITY_SCORE: 45
HEARING_DIFFICULTY_OR_DEAF: NO
ADLS_ACUITY_SCORE: 25
ADLS_ACUITY_SCORE: 25
ADLS_ACUITY_SCORE: 41
FALL_HISTORY_WITHIN_LAST_SIX_MONTHS: NO
ADLS_ACUITY_SCORE: 41

## 2025-04-22 NOTE — ANESTHESIA POSTPROCEDURE EVALUATION
Patient: Marylou Simms    Procedure: Procedure(s):  ESOPHAGOGASTRODUODENOSCOPY with gastric biopsies       Anesthesia Type:  MAC    Note:  Disposition: Inpatient   Postop Pain Control: Uneventful            Sign Out: Well controlled pain   PONV: No   Neuro/Psych: Uneventful            Sign Out: Acceptable/Baseline neuro status   Airway/Respiratory: Uneventful            Sign Out: Acceptable/Baseline resp. status   CV/Hemodynamics: Uneventful            Sign Out: Acceptable CV status; No obvious hypovolemia; No obvious fluid overload   Other NRE: NONE   DID A NON-ROUTINE EVENT OCCUR? No           Last vitals:  Vitals:    04/22/25 0845 04/22/25 0941 04/22/25 0950   BP: 112/59 115/64 126/65   Pulse: 63 68 64   Resp: 23 22 20   Temp: 36.8  C (98.2  F)     SpO2: 96% 99% (!) 90%       Electronically Signed By: Michelle Cheema MD  April 22, 2025  10:32 AM

## 2025-04-22 NOTE — PLAN OF CARE
Goal Outcome Evaluation:      Had EGD procedure this morning. Ate 100% of lunch. Independent in the room. Had a large BM around 3 PM. Pt said he did not see any blood in stool. Denied pain.     Shelby Hutchins RN

## 2025-04-22 NOTE — ANESTHESIA CARE TRANSFER NOTE
Patient: Marylou Simms    Procedure: Procedure(s):  ESOPHAGOGASTRODUODENOSCOPY with gastric biopsies       Diagnosis: Upper GI bleed [K92.2]  Diagnosis Additional Information: No value filed.    Anesthesia Type:   MAC     Note:    Oropharynx: oropharynx clear of all foreign objects  Level of Consciousness: awake  Oxygen Supplementation: nasal cannula  Level of Supplemental Oxygen (L/min / FiO2): 2  Independent Airway: airway patency satisfactory and stable  Dentition: dentition unchanged  Vital Signs Stable: post-procedure vital signs reviewed and stable  Report to RN Given: handoff report given  Destination: P1.          Vitals:  Vitals Value Taken Time   BP     Temp     Pulse     Resp     SpO2         Electronically Signed By: REHANA Reyes CRNA  April 22, 2025  8:03 AM

## 2025-04-22 NOTE — ED NOTES
"[facility] ED Handoff Report    ED Chief Complaint: melena, fever    ED Diagnosis:  (K92.2) Upper GI bleed  Comment:   Plan: Case Request: ESOPHAGOGASTRODUODENOSCOPY            (D64.9) Anemia, unspecified type  Comment:   Plan: blood infusions       PMH:    Past Medical History:   Diagnosis Date    Back pain     Gout         Code Status:  Full Code     Falls Risk: No Band: Not applicable    Current Living Situation/Residence: lives in a house     Elimination Status: Continent: Yes     Activity Level: Independent    Patients Preferred Language:  English     Needed: No    Vital Signs:  BP (!) 148/76   Pulse 65   Temp 98.2  F (36.8  C) (Oral)   Resp 22   Ht 1.676 m (5' 6\")   Wt (!) 151.5 kg (334 lb 1.6 oz)   SpO2 99%   BMI 53.93 kg/m       Cardiac Rhythm: NSR    Pain Score: 0/10    Is the Patient Confused:  No    Last Food or Drink: 4/21/25 at 2200 - pt on clear liquid diet    Focused Assessment:  Pt arrived to ED yesterday with c/o 5 days of black stools.  Takes ibuprofen for back pain, reports some etoh on the weekends.  Initial hgb 5.2 s/p 2 units of blood - hgb up to 7.6 then dropped again to 7.0 so third unit of blood ordered and initiated.  Pt reports LBM 4/21 am and was black.  Also reports shortness of breath with activity and feeling \"woozy\" with position changes.  Denies pain.  NPO with plans for EGD at 7:40am    Tests Performed: Done: Labs and Imaging    Treatments Provided:  blood infusion, protonix drip    Family Dynamics/Concerns: No    Family Updated On Visitor Policy: No    Plan of Care Communicated to Family: No     Who Was Updated about Plan of Care: no family at bedside presently    Belongings Checklist Done and Signed by Patient: Yes    Belongings Sent with Patient: yes    Medications sent with patient: N/A     Covid: asymptomatic , not tested    Additional Information:     RN: Eve Perry RN   4/22/2025 1:02 AM        "

## 2025-04-22 NOTE — PLAN OF CARE
Problem: Gastrointestinal Bleeding  Goal: Hemostasis  Outcome: Progressing   Goal Outcome Evaluation:           PT arrived from the ED . Hmong. Speaking English well. Very pleasant person. A/O . Ambulates: SBA. Surgery came to get him around 6: 20. His Hgb has been low. He had to receive 3-4 units of blood in the ER. He dips into the Mid 70s and 80s when he is sleeping. Put him on 3LO2 due sleep apnea and SOB. Denies pain. Can make his needs known. Currently with the surgical team.

## 2025-04-22 NOTE — PROGRESS NOTES
"[facility]    Medicine Progress Note - Hospitalist Service    Date of Admission:  4/21/2025    Assessment & Plan   39M recent 1200mg/day ibuprofen use, ~6beers/day on weekends a/w upper GIB.     #Upper GIB with acute blood loss anemia  #Gastric ulcers  Suspected PUD in setting of ibuprofen and alcohol use. EGD with GI on 4/22 revealed non-bleeding gastric ulcers with clean base  - GI consulted  - Continue IV PPI BID while here, transition to PO 40mg BID until repeat EGD in 2-3 months  - Follow up gastric path for H. pylori  - Avoid NSAIDs completely  - Can discharge tomorrow if stable     #Minimally elevated hsTrp - due to severe anemia     #Lumbar stenosis due to disc herniation and lipomatosis  -follow-up with neurosx OP     #gout - allopurinol     #DIANA, likely - outpatient sleep referral     #morbid obesity Body mass index is 52.33 kg/m . - consideration for GLP1RA          Diet: Regular Diet Adult    DVT Prophylaxis: Pneumatic Compression Devices  Snyder Catheter: Not present  Lines: None     Cardiac Monitoring: None  Code Status: Full Code      Clinically Significant Risk Factors           # Hypocalcemia: Lowest Ca = 8.4 mg/dL in last 2 days, will monitor and replace as appropriate                    # Severe Obesity: Estimated body mass index is 53.94 kg/m  as calculated from the following:    Height as of this encounter: 1.676 m (5' 6\").    Weight as of this encounter: 151.6 kg (334 lb 3.5 oz)., PRESENT ON ADMISSION            Social Drivers of Health    Housing Stability: High Risk (4/22/2025)    Housing Stability     Do you have housing? : No     Are you worried about losing your housing?: No   Tobacco Use: Medium Risk (4/22/2025)    Patient History     Smoking Tobacco Use: Former     Smokeless Tobacco Use: Never          Disposition Plan     Medically Ready for Discharge: Anticipated Tomorrow             POORNIMA BLUM MD  Hospitalist Service  [facility]  Securely message with Alex (more info)  Text page via [facility] Paging/Directory   ______________________________________________________________________    Interval History   Doing well post-EGD. No complaints.    Physical Exam   Vital Signs: Temp: 98.2  F (36.8  C) Temp src: Oral BP: 135/65 Pulse: 70   Resp: 23 SpO2: 93 % O2 Device: None (Room air) Oxygen Delivery: 2 LPM  Weight: 334 lbs 3.48 oz    General: No overt distress, conversational, non-toxic appearing  HEENT: MMM  Pulmonary: CTAB; no crackles, wheezing, or rhonchi, normal effort  Cardiac: RRR. No m/r/g  Abdomen: Soft. NT, ND.  Extremities: No bilateral LE edema  Neuro: alert and awake, Ox4    Medical Decision Making       >50 MINUTES SPENT BY ME on the date of service doing chart review, history, exam, documentation & further activities per the note.      Data     I have personally reviewed the following data over the past 24 hrs:    6.7  \   8.0 (L)   / 204     140 106 10.2 /  109 (H)   3.9 24 1.09 \       Imaging results reviewed over the past 24 hrs:   No results found for this or any previous visit (from the past 24 hours).

## 2025-04-22 NOTE — ANESTHESIA PREPROCEDURE EVALUATION
"Anesthesia Pre-Procedure Evaluation    Patient: Marylou Simms   MRN: [id number] : 1985        Procedure : Procedure(s):  ESOPHAGOGASTRODUODENOSCOPY          Past Medical History:   Diagnosis Date    Back pain     Gout       Past Surgical History:   Procedure Laterality Date    WISDOM TOOTH EXTRACTION        No Known Allergies   Social History     Tobacco Use    Smoking status: Former     Current packs/day: 0.00     Types: Cigarettes     Quit date:      Years since quittin.3    Smokeless tobacco: Never   Substance Use Topics    Alcohol use: Yes     Comment: 3-4/day      Wt Readings from Last 1 Encounters:   25 (!) 151.5 kg (334 lb 1.6 oz)           Physical Exam    Airway        Mallampati: II   TM distance: > 3 FB   Neck ROM: full   Mouth opening: > 3 cm    Respiratory Devices and Support         Dental     Comment: Wnl        Cardiovascular          Rhythm and rate: regular and normal     Pulmonary           breath sounds clear to auscultation           OUTSIDE LABS:  CBC:   Lab Results   Component Value Date    WBC 6.7 2025    WBC 8.5 2025    HGB 7.5 (L) 2025    HGB 7.5 (L) 2025    HCT 24.8 (L) 2025    HCT 17.9 (L) 2025     2025     2025     BMP:   Lab Results   Component Value Date     2025     2021    POTASSIUM 4.0 2025    POTASSIUM 3.7 2021    CHLORIDE 106 2025    CHLORIDE 106 2021    CO2 27 2025    CO2 26 2021    BUN 12.7 2025    BUN 13 2021    CR 1.02 2025    CR 1.14 2021     (H) 2025     2021     COAGS:   Lab Results   Component Value Date    PTT 26 2025    INR 1.01 2025     POC: No results found for: \"BGM\", \"HCG\", \"HCGS\"  HEPATIC:   Lab Results   Component Value Date    ALBUMIN 3.7 2025    PROTTOTAL 6.0 (L) 2025    ALT 33 2025    AST 19 2025    ALKPHOS 54 2025    BILITOTAL 0.5 " "04/21/2025     OTHER:   Lab Results   Component Value Date    JASWINDER 8.4 (L) 04/21/2025    MAG 2.2 04/21/2025       Anesthesia Plan    ASA Status:  3    NPO Status:  NPO Appropriate    Anesthesia Type: MAC.     - Reason for MAC: straight local not clinically adequate              Consents    Anesthesia Plan(s) and associated risks, benefits, and realistic alternatives discussed. Questions answered and patient/representative(s) expressed understanding.     - Discussed:     - Discussed with:  Patient      - Extended Intubation/Ventilatory Support Discussed: No.      - Patient is DNR/DNI Status: No     Use of blood products discussed: No .     Postoperative Care            Comments:    Other Comments: NPO. Meds reviewed. No recent URI. No problems with anesthesia in past. Pt admitted with hgb 5 yesterday, acute GI bleed requiring EGD. Pt has had no n/v today, stable.            Michelle Cheema MD    Clinically Significant Risk Factors Present on Admission           # Hypocalcemia: Lowest Ca = 8.4 mg/dL in last 2 days, will monitor and replace as appropriate              # Anemia: based on hgb <11  # Anemia: based on hgb <11       # Severe Obesity: Estimated body mass index is 53.93 kg/m  as calculated from the following:    Height as of this encounter: 1.676 m (5' 6\").    Weight as of this encounter: 151.5 kg (334 lb 1.6 oz).                "

## 2025-04-23 VITALS
DIASTOLIC BLOOD PRESSURE: 63 MMHG | SYSTOLIC BLOOD PRESSURE: 116 MMHG | OXYGEN SATURATION: 97 % | HEIGHT: 66 IN | RESPIRATION RATE: 19 BRPM | BODY MASS INDEX: 50.62 KG/M2 | TEMPERATURE: 98.5 F | HEART RATE: 67 BPM | WEIGHT: 315 LBS

## 2025-04-23 LAB
ERYTHROCYTE [DISTWIDTH] IN BLOOD BY AUTOMATED COUNT: 14.6 % (ref 10–15)
EST. AVERAGE GLUCOSE BLD GHB EST-MCNC: 97 MG/DL
HBA1C MFR BLD: 5 %
HCT VFR BLD AUTO: 24.6 % (ref 40–53)
HGB BLD-MCNC: 7.4 G/DL (ref 13.3–17.7)
MAGNESIUM SERPL-MCNC: 2.4 MG/DL (ref 1.7–2.3)
MCH RBC QN AUTO: 25.7 PG (ref 26.5–33)
MCHC RBC AUTO-ENTMCNC: 30.1 G/DL (ref 31.5–36.5)
MCV RBC AUTO: 85 FL (ref 78–100)
PATH REPORT.COMMENTS IMP SPEC: NORMAL
PATH REPORT.COMMENTS IMP SPEC: NORMAL
PATH REPORT.FINAL DX SPEC: NORMAL
PATH REPORT.GROSS SPEC: NORMAL
PATH REPORT.MICROSCOPIC SPEC OTHER STN: NORMAL
PATH REPORT.RELEVANT HX SPEC: NORMAL
PHOTO IMAGE: NORMAL
PLATELET # BLD AUTO: 208 10E3/UL (ref 150–450)
POTASSIUM SERPL-SCNC: 3.9 MMOL/L (ref 3.4–5.3)
RBC # BLD AUTO: 2.88 10E6/UL (ref 4.4–5.9)
WBC # BLD AUTO: 5.9 10E3/UL (ref 4–11)

## 2025-04-23 PROCEDURE — 250N000013 HC RX MED GY IP 250 OP 250 PS 637: Performed by: HOSPITALIST

## 2025-04-23 PROCEDURE — 83735 ASSAY OF MAGNESIUM: CPT | Performed by: STUDENT IN AN ORGANIZED HEALTH CARE EDUCATION/TRAINING PROGRAM

## 2025-04-23 PROCEDURE — 250N000011 HC RX IP 250 OP 636: Performed by: STUDENT IN AN ORGANIZED HEALTH CARE EDUCATION/TRAINING PROGRAM

## 2025-04-23 PROCEDURE — G0378 HOSPITAL OBSERVATION PER HR: HCPCS

## 2025-04-23 PROCEDURE — 36415 COLL VENOUS BLD VENIPUNCTURE: CPT | Performed by: STUDENT IN AN ORGANIZED HEALTH CARE EDUCATION/TRAINING PROGRAM

## 2025-04-23 PROCEDURE — 96376 TX/PRO/DX INJ SAME DRUG ADON: CPT

## 2025-04-23 PROCEDURE — 84132 ASSAY OF SERUM POTASSIUM: CPT | Performed by: STUDENT IN AN ORGANIZED HEALTH CARE EDUCATION/TRAINING PROGRAM

## 2025-04-23 PROCEDURE — 88342 IMHCHEM/IMCYTCHM 1ST ANTB: CPT | Mod: 26 | Performed by: PATHOLOGY

## 2025-04-23 PROCEDURE — 88305 TISSUE EXAM BY PATHOLOGIST: CPT | Mod: 26 | Performed by: PATHOLOGY

## 2025-04-23 PROCEDURE — 85014 HEMATOCRIT: CPT | Performed by: STUDENT IN AN ORGANIZED HEALTH CARE EDUCATION/TRAINING PROGRAM

## 2025-04-23 PROCEDURE — 83036 HEMOGLOBIN GLYCOSYLATED A1C: CPT | Performed by: STUDENT IN AN ORGANIZED HEALTH CARE EDUCATION/TRAINING PROGRAM

## 2025-04-23 PROCEDURE — 99239 HOSP IP/OBS DSCHRG MGMT >30: CPT | Performed by: STUDENT IN AN ORGANIZED HEALTH CARE EDUCATION/TRAINING PROGRAM

## 2025-04-23 RX ORDER — PANTOPRAZOLE SODIUM 40 MG/1
40 TABLET, DELAYED RELEASE ORAL
Qty: 180 TABLET | Refills: 1 | Status: SHIPPED | OUTPATIENT
Start: 2025-04-23

## 2025-04-23 RX ADMIN — ALLOPURINOL 300 MG: 300 TABLET ORAL at 08:58

## 2025-04-23 RX ADMIN — PANTOPRAZOLE SODIUM 40 MG: 40 INJECTION, POWDER, FOR SOLUTION INTRAVENOUS at 08:58

## 2025-04-23 ASSESSMENT — ACTIVITIES OF DAILY LIVING (ADL)
ADLS_ACUITY_SCORE: 25

## 2025-04-23 NOTE — PLAN OF CARE
Problem: Adult Inpatient Plan of Care  Goal: Optimal Comfort and Wellbeing  Outcome: Progressing     Problem: Gastrointestinal Bleeding  Goal: Optimal Coping with Acute Illness  Outcome: Progressing   Goal Outcome Evaluation:    Pt is A/O. Independent in the room. 3L O2  overnights . Regular diet. [facility]  site  experienced some trauma that cased bleeding and infiltration when he came back from CT. Writer removed the IV. HgB is still on the low side.  NO further incidents.

## 2025-04-23 NOTE — PLAN OF CARE
Goal Outcome Evaluation:       Discharge instructions explained to patient.  He verbalized understanding. Patient discharged home with family.

## 2025-04-23 NOTE — DISCHARGE SUMMARY
"[facility]  Hospitalist Discharge Summary      Date of Admission:  4/21/2025  Date of Discharge:  4/23/2025  Discharging Provider: POORNIMA BLUM MD  Discharge Service: Hospitalist Service    Discharge Diagnoses     #Upper GIB with acute blood loss anemia  #Gastric ulcers  #Non-MI troponin elevation  #Lumbar stenosis due to disc herniation and lipomatosis   #Gout   #Likely undiagnosed DIANA  #Morbid obesity     Clinically Significant Risk Factors     # Severe Obesity: Estimated body mass index is 54.9 kg/m  as calculated from the following:    Height as of this encounter: 1.676 m (5' 6\").    Weight as of this encounter: 154.3 kg (340 lb 2.7 oz).       Follow-ups Needed After Discharge   Follow-up Appointments       Follow Up      Follow up with GI doctors with [facility] for upper endoscopy in 2-3 months        Hospital Follow-up with Existing Primary Care Provider (PCP)          Schedule Primary Care visit within: 30 Days   Recommended labs and Imaging (to be ordered by Primary Care Provider): Arrange sleep study, follow up if GLP1 is possible               Unresulted Labs Ordered in the Past 30 Days of this Admission       Date and Time Order Name Status Description    4/22/2025  7:52 AM Surgical Pathology Exam In process         These results will be followed up by hospitalist    Discharge Disposition   Discharged to home  Condition at discharge: Stable    Hospital Course   39M recent 1200mg/day ibuprofen use, ~6beers/day on weekends a/w upper GIB.     #Upper GIB with acute blood loss anemia  #Gastric ulcers  Suspected PUD in setting of ibuprofen and alcohol use. EGD with GI on 4/22 revealed non-bleeding gastric ulcers with clean base.  - GI consulted  - IV PPI BID while here, transition to PO 40mg BID until repeat EGD in 2-3 months  - Follow up gastric path for H. Pylori, treat if present  - Avoid NSAIDs completely  - Hgb remained stable for discharge, he had a bowel movement without " melena     #Non-MI troponin elevation - due to severe anemia     #Lumbar stenosis due to disc herniation and lipomatosis  -follow-up with neurosx OP     #gout - allopurinol     #Likely undiagnosed DIANA - outpatient sleep referral, discussed with patient     #morbid obesity Body mass index is 52.33 kg/m . - consideration for GLP1RA    Consultations This Hospital Stay   CARE MANAGEMENT / SOCIAL WORK IP CONSULT  GASTROENTEROLOGY IP CONSULT    Code Status   Full Code    Time Spent on this Encounter   I, POORNIMA BLUM MD, personally saw the patient today and spent greater than 30 minutes discharging this patient.       POORNIMA BLUM MD  [address] [address]  Phone: [phone]  Fax: [phone]  ______________________________________________________________________    Physical Exam   Vital Signs: Temp: 98.5  F (36.9  C) Temp src: Oral BP: 116/63 Pulse: 67   Resp: 19 SpO2: 97 % O2 Device: None (Room air)    Weight: 340 lbs 2.72 oz    General: No overt distress, conversational, non-toxic appearing  HEENT: MMM  Pulmonary: CTAB; no crackles, wheezing, or rhonchi, normal effort  Cardiac: RRR. No m/r/g  Abdomen: Soft. NT, ND.  Extremities: No bilateral LE edema  Neuro: alert and awake, Ox4       Primary Care Physician   Freddy Vila    Discharge Orders      Reason for your hospital stay    Admitted for bleeding in your GI tract and found to have ulcers in your stomach. These very likely developed due to taking high doses of ibuprofen for several weeks. While NSAIDs like ibuprofen can be fine to take in small quantities on occasion, for these ulcers to heal we recommend you stop taking all NSAIDs and take the pantoprazole as prescribed    The GI doctors will reach out to arrange a repeat upper endoscopy (like you had here) in 2-3 months to confirm that these ulcers are healing.    The biopsies taken from your stomach have not resulted yet and can  take several days of processing, while it is unlikely, if there are any signs of bacterial infection of your stomach on those biopsies we will call you and let you know what to do next.     Activity    Your activity upon discharge: activity as tolerated     Follow Up    Follow up with GI doctors with [facility] for upper endoscopy in 2-3 months     Diet    Follow this diet upon discharge: Current Diet:Orders Placed This Encounter      Regular Diet Adult     Hospital Follow-up with Existing Primary Care Provider (PCP)            Significant Results and Procedures   Most Recent 3 CBC's:  Recent Labs   Lab Test 04/23/25  0723 04/22/25  1112 04/22/25  0652 04/21/25  1752 04/21/25  0847   WBC 5.9  --  6.7  --  8.5   HGB 7.4* 8.0* 7.5*   < > 5.2*   MCV 85  --  85  --  87     --  204  --  212    < > = values in this interval not displayed.     Most Recent 3 BMP's:  Recent Labs   Lab Test 04/23/25  0723 04/22/25  0652 04/21/25  0847 11/29/21  1555   NA  --  140 142 138   POTASSIUM 3.9 3.9 4.0 3.7   CHLORIDE  --  106 106 106   CO2  --  24 27 26   BUN  --  10.2 12.7 13   CR  --  1.09 1.02 1.14   ANIONGAP  --  10 9 6   JASWINDER  --  8.6* 8.4* 9.6   GLC  --  109* 125* 118   ,   Results for orders placed or performed during the hospital encounter of 04/21/25   CT Abdomen Pelvis w Contrast    Narrative    EXAM: CT ABDOMEN PELVIS W CONTRAST  LOCATION: [facility]  DATE: 4/21/2025    INDICATION: abdominal discomfort, dark stools  COMPARISON: None.  TECHNIQUE: CT scan of the abdomen and pelvis was performed following injection of IV contrast. Multiplanar reformats were obtained. Dose reduction techniques were used.  CONTRAST: IsoVue 370 90mL    FINDINGS:   LOWER CHEST: Normal.    HEPATOBILIARY: Mild hepatic steatosis. Gallbladder is unremarkable.    PANCREAS: Normal.    SPLEEN: Normal.    ADRENAL GLANDS: Normal.    KIDNEYS/BLADDER: Normal renal contours. No urolithiasis or hydronephrosis. Bladder is  unremarkable.    BOWEL: Normal caliber small and large bowel. Normal appendix. No free fluid or free air.    LYMPH NODES: No lymphadenopathy.    VASCULATURE: The abdominal aorta is nonaneurysmal.    PELVIC ORGANS: Normal contours.    MUSCULOSKELETAL: No worrisome bone lesions.      Impression    IMPRESSION:   1.  No acute abnormality in the abdomen or pelvis.  2.  Mild hepatic steatosis.   Echocardiogram Complete     Value    LVEF  65-70%    [facility]    [id number]  [id number]  [id number]  [id number]^KADY^[place] [address]     Name: FELIPE GIFFORD  MRN: [id number]  : 1985  Study Date: 2025 02:13 PM  Age: 39 yrs  Gender: Male  Patient Location: [facility] ([place]  Reason For Study: Myocardial Infarction  Ordering Physician: BANDAR HILLMAN  Performed By: ANTONINA     BSA: 2.5 m2  Height: 67 in  Weight: 334 lb  BP: 159/77 mmHg  ______________________________________________________________________________  Procedure  Echocardiogram with two-dimensional, color and spectral Doppler. Definity (NDC  #86463-003) given intravenously. Adequate quality two-dimensional was  performed and interpreted.  ______________________________________________________________________________  Interpretation Summary     The left ventricle is normal in size. Mild left ventricular hypertrophy,  moderate hypertrophy of the basal septum  The visual ejection fraction is 65-70%.  No regional wall motion abnormalities noted.  The right ventricle is normal in size and function.  No hemodynamically significant valvular abnormalities on 2D or color flow  imaging.  ______________________________________________________________________________  Left Ventricle  The left ventricle is normal in size. Mild left ventricular hypertrophy,  moderate hypertrophy of the basal septum. Left ventricular systolic function  is normal. The visual ejection fraction is 65-70%. No regional wall motion  abnormalities noted.     Right  Ventricle  The right ventricle is normal in size and function.     Atria  Normal left atrial size. Right atrial size is normal.     Mitral Valve  Mitral valve leaflets appear normal. There is mild (1+) mitral regurgitation.  There is no mitral valve stenosis.     Tricuspid Valve  Tricuspid valve leaflets appear normal. There is trace tricuspid  regurgitation. Right ventricular systolic pressure could not be approximated  due to inadequate tricuspid regurgitation.     Aortic Valve  The aortic valve is trileaflet. Aortic valve leaflets appear normal. No aortic  regurgitation is present. No aortic stenosis is present.     Pulmonic Valve  The pulmonic valve is not well visualized. There is trace pulmonic valvular  regurgitation.     Vessels  The aorta root is normal. IVC diameter and respiratory changes fall into an  intermediate range suggesting an RA pressure of 8 mmHg.     Pericardium  There is no pericardial effusion.     Rhythm  Sinus rhythm was noted.  ______________________________________________________________________________  MMode/2D Measurements & Calculations  IVSd: 1.6 cm  LVIDd: 5.4 cm  LVIDs: 3.2 cm  LVPWd: 1.2 cm  FS: 40.9 %  LV mass(C)d: 332.7 grams  LV mass(C)dI: 132.3 grams/m2  Ao root diam: 2.9 cm  asc Aorta Diam: 3.1 cm  LVOT diam: 2.0 cm  LVOT area: 3.1 cm2  Ao root diam index Ht(cm/m): 1.7  Ao root diam index BSA (cm/m2): 1.2  Asc Ao diam index BSA (cm/m2): 1.2  Asc Ao diam index Ht(cm/m): 1.8  EF Biplane: 69.7 %     LA Volume Indexed (AL/bp): 25.4 ml/m2  RV Base: 3.3 cm  RWT: 0.45  TAPSE: 2.5 cm     Time Measurements  MM HR: 68.0 BPM     Doppler Measurements & Calculations  MV E max hemal: 111.0 cm/sec  MV A max hemal: 55.1 cm/sec  MV E/A: 2.0  MV max P.8 mmHg  MV mean P.0 mmHg  MV V2 VTI: 34.3 cm  MVA(VTI): 2.6 cm2  MV dec slope: 781.0 cm/sec2  MV dec time: 0.14 sec  Ao V2 max: 202.0 cm/sec  Ao max P.0 mmHg  Ao V2 mean: 131.0 cm/sec  Ao mean P.0 mmHg  Ao V2 VTI: 38.3 cm  FLORENTINO(I,D):  2.3 cm2  FLORENTINO(V,D): 2.2 cm2     LV V1 max P.3 mmHg  LV V1 max: 143.0 cm/sec  LV V1 VTI: 28.0 cm  SV(LVOT): 87.8 ml  SI(LVOT): 34.9 ml/m2  PA V2 max: 117.0 cm/sec  PA max P.5 mmHg  PA acc time: 0.08 sec  TR max jayro: 230.0 cm/sec  TR max P.2 mmHg  AV Jayro Ratio (DI): 0.71  FLORENTINO Index (cm2/m2): 0.91  E/E': 9.3  E/E' av.0  Lateral E/e': 8.7  Medial E/e': 9.3  Peak E' Jayro: 12.0 cm/sec  RV S Jayro: 16.8 cm/sec     ______________________________________________________________________________  Report approved by: Deangelo Jimenez on 2025 03:10 PM             Discharge Medications   Current Discharge Medication List        START taking these medications    Details   pantoprazole (PROTONIX) 40 MG EC tablet Take 1 tablet (40 mg) by mouth 2 times daily (before meals).  Qty: 180 tablet, Refills: 1    Associated Diagnoses: Upper GI bleed           CONTINUE these medications which have NOT CHANGED    Details   acetaminophen (TYLENOL) 500 MG tablet Take 1,000 mg by mouth every 6 hours as needed for mild pain.      allopurinol (ZYLOPRIM) 300 MG tablet Take 300 mg by mouth daily.      cetirizine (ZYRTEC) 10 MG tablet Take 10 mg by mouth daily as needed for allergies.      colchicine (COLCYRS) 0.6 MG tablet Take 0.6 mg by mouth 2 times daily as needed for gout pain.      cyclobenzaprine (FLEXERIL) 10 MG tablet Take 10 mg by mouth 3 times daily.           STOP taking these medications       ibuprofen (ADVIL/MOTRIN) 600 MG tablet Comments:   Reason for Stopping:             Allergies   No Known Allergies

## 2025-04-27 ENCOUNTER — HEALTH MAINTENANCE LETTER (OUTPATIENT)
Age: 40
End: 2025-04-27

## 2025-08-19 ENCOUNTER — HOSPITAL ENCOUNTER (EMERGENCY)
Facility: HOSPITAL | Age: 40
Discharge: HOME | End: 2025-08-19
Payer: COMMERCIAL

## 2025-08-19 ENCOUNTER — APPOINTMENT (OUTPATIENT)
Dept: CT IMAGING | Facility: HOSPITAL | Age: 40
End: 2025-08-19
Payer: COMMERCIAL

## 2025-08-19 VITALS
TEMPERATURE: 98.9 F | RESPIRATION RATE: 24 BRPM | OXYGEN SATURATION: 95 % | HEART RATE: 71 BPM | SYSTOLIC BLOOD PRESSURE: 163 MMHG | HEIGHT: 67 IN | BODY MASS INDEX: 49.44 KG/M2 | DIASTOLIC BLOOD PRESSURE: 82 MMHG | WEIGHT: 315 LBS

## 2025-08-19 DIAGNOSIS — R31.9 HEMATURIA, UNSPECIFIED TYPE: Primary | ICD-10-CM

## 2025-08-19 LAB
ALBUMIN UR-MCNC: 70 MG/DL
ANION GAP SERPL CALCULATED.3IONS-SCNC: 12 MMOL/L (ref 7–15)
APPEARANCE UR: CLEAR
BASOPHILS # BLD AUTO: 0.04 10E3/UL (ref 0–0.2)
BASOPHILS NFR BLD AUTO: 0.5 %
BILIRUB UR QL STRIP: NEGATIVE
BUN SERPL-MCNC: 11 MG/DL (ref 6–20)
CALCIUM SERPL-MCNC: 9.1 MG/DL (ref 8.8–10.4)
CHLORIDE SERPL-SCNC: 101 MMOL/L (ref 98–107)
COLOR UR AUTO: ABNORMAL
CREAT SERPL-MCNC: 1.03 MG/DL (ref 0.67–1.17)
EGFRCR SERPLBLD CKD-EPI 2021: >90 ML/MIN/1.73M2
EOSINOPHIL # BLD AUTO: 0.26 10E3/UL (ref 0–0.7)
EOSINOPHIL NFR BLD AUTO: 3.3 %
ERYTHROCYTE [DISTWIDTH] IN BLOOD BY AUTOMATED COUNT: 22.1 % (ref 10–15)
GLUCOSE SERPL-MCNC: 142 MG/DL (ref 70–99)
GLUCOSE UR STRIP-MCNC: >1000 MG/DL
HCO3 SERPL-SCNC: 26 MMOL/L (ref 22–29)
HCT VFR BLD AUTO: 35.9 % (ref 40–53)
HGB BLD-MCNC: 8.9 G/DL (ref 13.3–17.7)
HGB UR QL STRIP: NEGATIVE
IMM GRANULOCYTES # BLD: 0.04 10E3/UL
IMM GRANULOCYTES NFR BLD: 0.5 %
KETONES UR STRIP-MCNC: NEGATIVE MG/DL
LEUKOCYTE ESTERASE UR QL STRIP: NEGATIVE
LYMPHOCYTES # BLD AUTO: 1.73 10E3/UL (ref 0.8–5.3)
LYMPHOCYTES NFR BLD AUTO: 22.1 %
MCH RBC QN AUTO: 16.4 PG (ref 26.5–33)
MCHC RBC AUTO-ENTMCNC: 24.8 G/DL (ref 31.5–36.5)
MCV RBC AUTO: 66 FL (ref 78–100)
MONOCYTES # BLD AUTO: 0.46 10E3/UL (ref 0–1.3)
MONOCYTES NFR BLD AUTO: 5.9 %
NEUTROPHILS # BLD AUTO: 5.3 10E3/UL (ref 1.6–8.3)
NEUTROPHILS NFR BLD AUTO: 67.7 %
NITRATE UR QL: NEGATIVE
NRBC # BLD AUTO: <0.03 10E3/UL
NRBC BLD AUTO-RTO: 0.3 /100
PH UR STRIP: 6 [PH] (ref 5–7)
PLATELET # BLD AUTO: 238 10E3/UL (ref 150–450)
POTASSIUM SERPL-SCNC: 3.8 MMOL/L (ref 3.4–5.3)
RBC # BLD AUTO: 5.44 10E6/UL (ref 4.4–5.9)
RBC URINE: 2 /HPF
SODIUM SERPL-SCNC: 139 MMOL/L (ref 135–145)
SP GR UR STRIP: 1.02 (ref 1–1.03)
UROBILINOGEN UR STRIP-MCNC: NORMAL MG/DL
WBC # BLD AUTO: 7.83 10E3/UL (ref 4–11)
WBC URINE: <1 /HPF

## 2025-08-19 PROCEDURE — 80048 BASIC METABOLIC PNL TOTAL CA: CPT

## 2025-08-19 PROCEDURE — 99284 EMERGENCY DEPT VISIT MOD MDM: CPT | Mod: 25

## 2025-08-19 PROCEDURE — 81001 URINALYSIS AUTO W/SCOPE: CPT

## 2025-08-19 PROCEDURE — 85004 AUTOMATED DIFF WBC COUNT: CPT

## 2025-08-19 PROCEDURE — 74176 CT ABD & PELVIS W/O CONTRAST: CPT

## 2025-08-19 PROCEDURE — 36415 COLL VENOUS BLD VENIPUNCTURE: CPT

## 2025-08-19 ASSESSMENT — COLUMBIA-SUICIDE SEVERITY RATING SCALE - C-SSRS
1. IN THE PAST MONTH, HAVE YOU WISHED YOU WERE DEAD OR WISHED YOU COULD GO TO SLEEP AND NOT WAKE UP?: NO
2. HAVE YOU ACTUALLY HAD ANY THOUGHTS OF KILLING YOURSELF IN THE PAST MONTH?: NO
6. HAVE YOU EVER DONE ANYTHING, STARTED TO DO ANYTHING, OR PREPARED TO DO ANYTHING TO END YOUR LIFE?: NO

## 2025-08-19 ASSESSMENT — ACTIVITIES OF DAILY LIVING (ADL)
ADLS_ACUITY_SCORE: 48

## (undated) DEVICE — SUCTION MANIFOLD NEPTUNE 2 SYS 1 PORT 702-025-000

## (undated) DEVICE — SOL WATER IRRIG 1000ML BOTTLE 2F7114

## (undated) DEVICE — TUBING SUCTION MEDI-VAC 1/4"X20' N620A

## (undated) DEVICE — FORCEP BIOPSY 2.3MM DISP COATED 000388

## (undated) RX ORDER — LIDOCAINE HYDROCHLORIDE 10 MG/ML
INJECTION, SOLUTION EPIDURAL; INFILTRATION; INTRACAUDAL; PERINEURAL
Status: DISPENSED
Start: 2025-04-22

## (undated) RX ORDER — ONDANSETRON 2 MG/ML
INJECTION INTRAMUSCULAR; INTRAVENOUS
Status: DISPENSED
Start: 2025-04-22

## (undated) RX ORDER — PROPOFOL 10 MG/ML
INJECTION, EMULSION INTRAVENOUS
Status: DISPENSED
Start: 2025-04-22